# Patient Record
Sex: FEMALE | Race: OTHER | NOT HISPANIC OR LATINO | URBAN - METROPOLITAN AREA
[De-identification: names, ages, dates, MRNs, and addresses within clinical notes are randomized per-mention and may not be internally consistent; named-entity substitution may affect disease eponyms.]

---

## 2019-08-01 PROBLEM — 275937001 FAMILY HISTORY OF CANCER: Status: ACTIVE | Noted: 2019-08-01

## 2019-08-26 PROBLEM — 55822004 HYPERLIPIDEMIA: Status: ACTIVE | Noted: 2019-08-01

## 2019-08-26 PROBLEM — 197321007 FATTY LIVER: Status: ACTIVE | Noted: 2019-08-01

## 2019-08-26 PROBLEM — 166603001 ABNORMAL RESULTS OF LIVER FUNCTION STUDIES: Status: ACTIVE | Noted: 2019-08-01

## 2020-07-08 ENCOUNTER — TELEPHONE ENCOUNTER (OUTPATIENT)
Dept: URBAN - METROPOLITAN AREA CLINIC 92 | Facility: CLINIC | Age: 50
End: 2020-07-08

## 2020-07-08 NOTE — HPI-OTHER HISTORIES
Spoke with pt and she  went back on nortryptiline and she did labs with rheum and she says that it came back and went up ast 85 and alt 105 and she stopped social alcohol. No etoh x 3 weeks and  did labs ast 43 and alt 63.  alcohol does add to fat in the liver and adds to inflammation.   I would love to hear not alcohol and no nortryptiline labs.  She says failed all migraine meds.  Only med she says works is this is nortryptiline.  She on july 14 to see [place] neurologist re whit.  Maybe could try to do that and ask for something to help.  Encourage her to stay off the alcohol and follow and see how the labs and if the meds would expect it to be creeping up.  She can look into that.  Would love do a telemed at [facility] and she will let us know when sees neurology.

## 2020-07-15 ENCOUNTER — TELEPHONE ENCOUNTER (OUTPATIENT)
Dept: URBAN - METROPOLITAN AREA CLINIC 92 | Facility: CLINIC | Age: 50
End: 2020-07-15

## 2020-07-15 NOTE — HPI-TODAY'S VISIT:
Spoke with pt: she spoke with [facility] neurologist and he said not seen it do issue.  using topamax and she will try it and see how does.  Perlivertox  Prospective studies suggest that less than 1% of subjects develop elevations in serum aminotransferase levels during long term topiramate therapy. Clinically apparent hepatotoxicity from topiramate is quite rare and usually arises in patients receiving multiple other anticonvulsants. Topiramate is metabolized by CYP 3A4 and may increase risk of valproate or other anticonvulsant hepatotoxicity. A distinctive syndrome is the development of lethargy, weakness with marked serum aminotransferase elevations and hyperammonemia arising within 2 to 3 weeks of the addition (or dose increase) of topiramate to long term valproate therapy. While valproate alone can cause a similar syndrome, it appears much more common (~1%) with the combination than with valproate alone (~0.1%). This syndrome has several features suggestive of Reye syndrome (hyperammoniemia, hypoglycemia, rapid reversal of injury) and in many instances is preceded by a acute viral illness. Topiramate by itself has only rarely been linked to clinically apparent liver injury and the clinical features and course of injury have not been well defined. Topiramate has not been linked to cases of the anticonvulsant hypersensitivity syndrome and is considered a safe alternative in patients with that syndrome.  She will do labs again when can and then in 2 and 4 weeks post switch.  She needs to make an appt.

## 2020-07-21 ENCOUNTER — TELEPHONE ENCOUNTER (OUTPATIENT)
Dept: URBAN - METROPOLITAN AREA CLINIC 92 | Facility: CLINIC | Age: 50
End: 2020-07-21

## 2020-07-21 NOTE — HPI-OTHER HISTORIES
This is a scheduled follow-up appointment for this patient, a 49 year old Other Race female, after a previous visit on Dec 2019, for an evaluation for elevated liver enzymes and and history of liver biopsy. A copy of this document will be sent to the referring provider.  The patient reports a personal history of no other habits that could cause liver damage.  Chart review and pt visit review:     Pt came originally came in as a self referral for 2nd opinion re abnormal liver labs.  Saw Dr Kumar: [place] July 2020: Assessment/Plan    49 year old female who is referred for evaluation of headaches and to find alternative to nortriptyline. Her limited neurologic examination is normal. Her headaches are consistent with migraines.    headaches- migraines. Well controlled with nortriptyline, however this has been deemed cause for liver injury. I am uncertain as this would be unusual and her liver enzymes reportedly are near normal despite resumption of nortriptyline the past 3 months  - trial topamax as replacement; alternatives to consider include possibly memantine, botox. will avoid propranolol given depression history  - rizatriptan prn   seizures- she wishes to continue medication for now. No seizures since ~2016  - keppra 750mg bid  - she reports normal MRI brain in 2020   leg discomfort- possible nocturnal leg cramps. she will trial increasing gabapentin to 1200mg total before bedtime and if not helpful she knows to wean off  - discussed B complex, leg exercises  - may consider tizanidine prn   follow up in 3 months   Document Type:	CT Abdomen + Pelvis w/ IV Contrast Document Date:	February 21, 2020 14:35  Document Status:	Auth (Verified) Document Title:	CT Abdomen + Pelvis w/ IV Contrast Performed By:	Jesús Arrieta  Verified By:	Rex Campbell IV on February 21, 2020 16:26  Encounter info:	[id number], [facility], Single Visit OP, 2/21/2020 - 2/21/2020   * Final Report *  Reason For Exam Other; H/o new dermatomyositis, weight loss;Other  REPORT EXAM: CT Abdomen + Pelvis w/ IV Contrast  CLINICAL INDICATION: H/o new dermatomyositis, weight loss.  TECHNIQUE: Following administration of non-ionic IV contrast, postcontrast images through the abdomen and pelvis were obtained. [id number] If applicable, point-of-care testing was approved following departmental protocol.  COMPARISON: None  FINDINGS:  Lower Thorax: Please see separately dictated report of the chest from the same day for findings above the diaphragm.  Liver: Normal.  Gallbladder/Biliary Tree: Gallstones without acute cholecystitis.  Spleen: Normal.  Pancreas: Normal.  Adrenal Glands: Normal.  Kidneys/Ureters: Normal.  Gastrointestinal: Moderate stool burden.   Bladder: Normal.  Uterus/Adnexa: Leiomyomatous uterus with multiple exophytic fibroids. Left ovarian follicular cyst. Incidental nabothian cyst.  Lymph Nodes: Normal.  Vessels: Normal.  Peritoneum/Retroperitoneum: Normal.  Bones/Soft Tissues: Sclerotic focus, likely a bone island in the anterior L4 vertebral body (series 12, image 43).  IMPRESSION:  1.  Cholelithiasis without findings of acute cholecystitis. 2.  Leiomyomatous uterus.  The images were reviewed and interpreted by Rex Campbell MD.  Signature Line *** Final ***  Electronically Signed By:  Rex Campbell IV on  02/21/2020 16:26  Dictated by:  Jesús Arrieta   Oct 28 2019 u/s liver mildly inc echo an dsugg hepatic steatosis and no foal leisons and multiple echogenic stones in gb up to 1.2cm. GB wall not thickened and no pericholecystic fluid. GB wall no tthickened and bile ducts not dilated and cbd 4mm. Imaged pancreas portions unremarkable and spleen not enlarged and no ascites and right kidney 9.6cm. No hydronephrosis. Vessels patent.   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 Mod to severe balloning and no glycogenated nuclei and no sig protal inflammation. Bile duct unremarkable and no globules. ther recommend clincial correlation to aassess for alcohol or other med/suppleent relayed vs metabolic fatty liver. No features of autoimmune hepatitis.  oct 14 2019 igg [id number] and igm 186.   Sept 26 2019 wbc 7.6 hg 11.2 plar 350 and glu88 and cr 0.79 and na 137 and k 4.2 and ca 10 and alb 4.1 and tb 0.3 and alk 151 and ast 105 an dalt 101 and iron sat low 8% and alpha one 180 and Mm and hcv ab neg and b not immune <3.1 and asma neg and ama neg and andres pos. antichomatin 1.2 (drug induced lupus) ceruloplasmin 29.7 and ferritn 50 and b sag neg and b core total neg and hep a neg.   Saw rheum Dr munoz and did trial of steroids and felt ill and so being weaned off. She also did tetsign to suggest drug induced issue and so stopepd nortriptyline.  per livertox: Hepatotoxicity Liver test abnormalities have been reported to occur in up to 16% of patients being treated with tricyclic antidepressants, but elevations are uncommonly above 3 times the upper limit of normal. The aminotransferase abnormalities are usually mild, asymptomatic and transient, reversing even with continuation of medication. Rare instances of clinically apparent acute liver injury have been reported due to nortriptyline. The onset of jaundice is usually within 2 to 3 months of starting nortriptyline and the predominant enzyme pattern has been hepatocellular. Several acute instances of nortriptyline hepatotoxicity with marked elevations in serum aminotransferase levels and acute liver failure have been described. Signs and symptoms of hypersensitivity and autoimmunity are usually not present.  Mechanism of Injury The mechanism by which nortriptyline causes serum aminotransferase elevations and acute liver injury is not known. It undergoes extensive hepatic metabolism and a possible cause of liver injury is production of a toxic intermediate of metabolism.   Pt now here to review issues:  Dr Munoz was concerned re the keppra also but she was not info.   She is on nortriptyline for migraines and she did an mri of the head for them Dr Garcia for this. She stopped it cold turkey and I would favor a slower taper with a substitute started for this. She failed and is now back on it.    Sept 26 2019 wbc 7.6 hg 11.2 plat 350 and glu 88 and cr 0.79 and na 137 and ak 4.2 and cl 103 and alb 4.1 and tb 0.3 and alk 151 ast 105 and alt 101. iron sat 8% and she is known to this. alpha one 180 and MM,  hcv ab neg, hep b not immune <3.1 and asma neg 16 and ama  less than 20 and andres positive.  antichromatin 1.1 (0-0.9) related to drug induced lupus. ceruloplasmin 29.7 and ferrtin 50 and b sag neg and b core total neg and hep a neg.  Aug 12 2019 ahi: gallstones present in the gb. Adenomyomatosis seen gb fundus.  No gb wall thickening and no fluid aroudn the gb and murphys sign. CBd not dilated and liver and right kdiney and observed pancraes unremarkable.  No doppler.  She came in today with her  and we reviewed this.  I really think we need to check the labs now in [place] to the bx info and we don't have that as not typical for fatty liver.  She prior brought in records from [facility] [place].  RECAP:  Sept 7 2016 visit to Dr Stuart Francisco: mentions inflammatory polyarthropathy and possible SLE and AIH and other dermatitis and adjustment disorder with depressed mood.  Lists her on Pred 10mg 1.5 po qd, plaquenil 200mg po qd, zithromax 500mg po qd, prozac 40mg 2po qd, nortriptyline 25mg po qd at the time. She was reported to be doing 150 min aerobic exercise per week.  Counseled re a low fat and low card diet. Weigth was 140 pounds and height 5'1" and bmi 26.43.  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 She was having a facial rash at the time.  She says the seizure diagnosis was 3 yrs ago but has had 3 seizures and on the keppra and she says thatshe was kept on it as scan was abnormal and the doctor who is seeing at [facility]. Dr Ferrer. She says he sees her about once a year or so. Told she had white spots and saw white spots on the scans and saw [place] and [place] and no changes and last scan 3 yrs ago.  She has been on keppra along the way for this.  SHe says the labs were abnormal pre the possible lupus and mctd and inflammatory polyarthropathy. She says a doctor in [place] that she had this diagnosed.   The doctor in [place] was a friend of theirs but they live here and  worked in [place] at the time.  Her mom passed and she had post partum depression and had facial acne/rash issues and says that  saw local doctor and they gave abx and nothing helping.  She saw a lot of doctors in [place] and in [place] and nobody could help.  She says the doctors noted the lfts but 3-4 yrs now and that was before that. She says told up and to do diet.  The bx was done in [place] visit and that doctor did the liver biopsy because he suspected that lupus or aih could be th issue.  Sept 12 2016  bx minimal features for steatohepatitis mild and few lipoid granulomas/nonspecific and definitive evidence of viral hepatitis or obstructive features not identified.  She was also found to have hyperlipidemia Dr Wagner her primary saw and did not start tx.  She was seeing another gi specialist Dr Jv Davies [facility] saw there and he told her that it was fatty liver and to do diet and gave to do labs in 3m and then to see her.  She mentioned that Dr Davies was not able to get get the slides sent down for a review but he did get the info.  The doctor in [facility] still thinks that she has lupus but not clear but not on the meds for and was on them for a year and taken off after a year. She does not recall if the meds helped the lfts.  Regarding alcohol, she does not drink much and would have an occ drink or glass of wine and she stopped this all for 2m. Prior light.   She lost 10 pounds on the diet.  I think to me the  focus on trying to get the liver biopsy slides sent for 2nd opinion here with Dr Magallon and see what she says and so we can assess this.  We ened to get the IGG and IGM and follow the labs again and get the doppler u.s done and we can reassess where we are at.  40% of fatty liver can see an andres pos and follow this.  We could ask Dr Oliva Munoz to see re could this be lupus or a lupus variant.  Main medicine that she is on that could cause this  is the noortryptiline and using for migraines.  See her neurologist and ask for something in place of the nortryptiline and see how she does off of this. She has been on it for 28 yrs.  I would first try to get the medicine off and if labs not better, then do the rheum effect.   Livertox web re this med: from today as below: "Hepatotoxicity Liver test abnormalities have been reported to occur in up to 16% of patients being treated with tricyclic antidepressants, but elevations are uncommonly above 3 times the upper limit of normal.  The aminotransferase abnormalities are usually mild, asymptomatic and transient, reversing even with continuation of medication.  Rare instances of clinically apparent acute liver injury have been reported due to nortriptyline.  The onset of jaundice is usually within 2 to 3 months of starting nortriptyline and the predominant enzyme pattern has been hepatocellular.  Several acute instances of nortriptyline hepatotoxicity with marked elevations in serum aminotransferase levels and acute liver failure have been described.  Signs and symptoms of hypersensitivity and autoimmunity are usually not present.   Mechanism of Injury The mechanism by which nortriptyline causes serum aminotransferase elevations and acute liver injury is not known.  It undergoes extensive hepatic metabolism and a possible cause of liver injury is production of a toxic intermediate of metabolism.   Outcome and Management The serum aminotransferase elevations that occur on nortriptyline therapy are usually self-limited and do not require dose modification or discontinuation of therapy.  The acute hepatitis caused by nortriptyline can be severe and lead to acute liver failure.  No cases of chronic liver injury or vanishing bile duct syndrome have been reported with nortriptyline therapy.  While cross reactivity of hepatic injury with other tricyclic antidepressants has rarely been described, amitriptyline is metabolized to nortriptyline which is its active form.  Thus, switching to amitriptyline after nortriptyline toxicity should be avoided.  Switching to other forms of antidepressants such as the selective serotonin reuptake inhibitors is likely to be safe."  Duration of the visit was 25 minutes with greater than 50% of the time spent reviewing the chart and events with the pt and her  and in discussing her hx and the test results.

## 2020-07-23 ENCOUNTER — OFFICE VISIT (OUTPATIENT)
Dept: URBAN - METROPOLITAN AREA TELEHEALTH 2 | Facility: TELEHEALTH | Age: 50
End: 2020-07-23
Payer: COMMERCIAL

## 2020-07-23 DIAGNOSIS — R74.8 ABNORMAL LIVER ENZYMES: ICD-10-CM

## 2020-07-23 DIAGNOSIS — E78.5 HYPERLIPIDEMIA, UNSPECIFIED HYPERLIPIDEMIA TYPE: ICD-10-CM

## 2020-07-23 DIAGNOSIS — F41.9 ANXIETY DISORDER, UNSPECIFIED: ICD-10-CM

## 2020-07-23 DIAGNOSIS — Z98.890 HISTORY OF LIVER BIOPSY: ICD-10-CM

## 2020-07-23 DIAGNOSIS — Z86.010 HISTORY OF COLON POLYPS: ICD-10-CM

## 2020-07-23 DIAGNOSIS — K71.8 TOXIC LIVER DISEASE WITH OTHER DISORDERS OF LIVER: ICD-10-CM

## 2020-07-23 DIAGNOSIS — Z71.89 VACCINE COUNSELING: ICD-10-CM

## 2020-07-23 DIAGNOSIS — G43.709 CHRONIC MIGRAINE WITHOUT AURA WITHOUT STATUS MIGRAINOSUS, NOT INTRACTABLE: ICD-10-CM

## 2020-07-23 DIAGNOSIS — D13.5 ADENOMYOMATOSIS OF GALLBLADDER: ICD-10-CM

## 2020-07-23 DIAGNOSIS — K71.9 DRUG-INDUCED LIVER INJURY: ICD-10-CM

## 2020-07-23 DIAGNOSIS — Z79.899 HIGH RISK MEDICATION USE: ICD-10-CM

## 2020-07-23 DIAGNOSIS — K80.20 GALLSTONES: ICD-10-CM

## 2020-07-23 DIAGNOSIS — E66.3 OVERWEIGHT: ICD-10-CM

## 2020-07-23 PROCEDURE — G9903 PT SCRN TBCO ID AS NON USER: HCPCS

## 2020-07-23 PROCEDURE — 99214 OFFICE O/P EST MOD 30 MIN: CPT

## 2020-07-23 PROCEDURE — 1036F TOBACCO NON-USER: CPT

## 2020-07-23 PROCEDURE — G8417 CALC BMI ABV UP PARAM F/U: HCPCS

## 2020-07-23 PROCEDURE — G8427 DOCREV CUR MEDS BY ELIG CLIN: HCPCS

## 2020-07-23 RX ORDER — PREDNISONE 5 MG/1
1 TABLET TABLET ORAL ONCE A DAY
Refills: 0 | Status: ACTIVE | COMMUNITY
Start: 1900-01-01

## 2020-07-23 RX ORDER — AZATHIOPRINE 50 MG/1
3 TABLET ORAL
Status: ACTIVE | COMMUNITY

## 2020-07-23 RX ORDER — HYDROXYCHLOROQUINE SULFATE 200 MG/1
1.5 PO QD TABLET ORAL
Refills: 0 | Status: ACTIVE | COMMUNITY
Start: 1900-01-01

## 2020-07-23 RX ORDER — FLUOXETINE HYDROCHLORIDE 40 MG/1
TAKE 2 CAPSULES (80 MG) BY ORAL ROUTE ONCE DAILY IN THE MORNING CAPSULE ORAL 1
Qty: 0 | Refills: 0 | Status: ACTIVE | COMMUNITY
Start: 1900-01-01

## 2020-07-23 NOTE — HPI-OTHER HISTORIES
This is a scheduled follow-up appointment for this patient, a 49 year old [place] female, after a previous visit on Dec 2019, for an evaluation for elevated liver enzymes and and history of liver biopsy. A copy of this document will be sent to the referring provider.   The patient reports a personal history of no other habits that could cause liver damage.   Chart review and pt visit review:    Pt came originally came in as a self referral for 2nd opinion re abnormal liver labs.  Saw Dr Kumar: [facility] 2020: Assessment/Plan    49 year old female who is referred for evaluation of headaches and to find alternative to nortriptyline. Her limited neurologic examination is normal. Her headaches are consistent with migraines.   headaches- migraines. Well controlled with nortriptyline, however this has been deemed cause for liver injury. I am uncertain as this would be unusual and her liver enzymes reportedly are near normal despite resumption of nortriptyline the past 3 months  - trial topamax as replacement; alternatives to consider include possibly memantine, botox. will avoid propranolol given depression history  - rizatriptan prn   seizures- she wishes to continue medication for now. No seizures since ~  - keppra 750mg bid  - she reports normal MRI brain in    leg discomfort- possible nocturnal leg cramps. she will trial increasing gabapentin to 1200mg total before bedtime and if not helpful she knows to wean off  - discussed B complex, leg exercises  - may consider tizanidine prn   follow up in 3 months  Gaby 3 2020 na 139 and k 4.3 and cl 104 and co2 26 and glu 140 and cr 0.81 an daklb 3.9 and tb 0.3 and ast 84 and alt 122 and alk 75. cpk 40 and wbc 6.8 hg 10.3 and plat 365. esr 26.   Edward 3 2020 ast 67 and alt 101 and alk 103 and tb 0.3 and wbc 14.5 and hg 11 and plat 406.   2020 factin 6 and ama 2.7 and lkm  less than 1:20.  anad <1:80 and dsdna 0.8 and sydney neg and scl 100 pos.  b sag neg and b sab neg and b core total neg and hcv ab neg, HIV neg,   She did labs avoiding all social alcohol ast 55 and alt 48.  The plan that appears best for her now is to stay off the social alcohol and follow labs and see if she gets normal.  If not then revisit the medicine role.  She is ready to do a back up med if not there.   Document Type:	CT Abdomen + Pelvis w/ IV Contrast Document Date:	2020 14:35  Document Status:	Auth (Verified) Document Title:	CT Abdomen + Pelvis w/ IV Contrast Performed By:	Jesús Arrieta  Verified By:	Rex Campbell IV on 2020 16:26  Encounter info:	[id number], [facility], Single Visit OP, 2020 - 2020   * Final Report *  Reason For Exam Other; H/o new dermatomyositis, weight loss;Other  REPORT EXAM: CT Abdomen + Pelvis w/ IV Contrast  CLINICAL INDICATION: H/o new dermatomyositis, weight loss.  TECHNIQUE: Following administration of non-ionic IV contrast, postcontrast images through the abdomen and pelvis were obtained. [id number].[id number] If applicable, point-of-care testing was approved following departmental protocol.  COMPARISON: None  FINDINGS:  Lower Thorax: Please see separately dictated report of the chest from the same day for findings above the diaphragm.  Liver: Normal.  Gallbladder/Biliary Tree: Gallstones without acute cholecystitis.  Spleen: Normal.  Pancreas: Normal.  Adrenal Glands: Normal.  Kidneys/Ureters: Normal.  Gastrointestinal: Moderate stool burden.   Bladder: Normal.  Uterus/Adnexa: Leiomyomatous uterus with multiple exophytic fibroids. Left ovarian follicular cyst. Incidental nabothian cyst.  Lymph Nodes: Normal.  Vessels: Normal.  Peritoneum/Retroperitoneum: Normal.  Bones/Soft Tissues: Sclerotic focus, likely a bone island in the anterior L4 vertebral body (series 12, image 43).  IMPRESSION:  1.  Cholelithiasis without findings of acute cholecystitis. 2.  Leiomyomatous uterus.  The images were reviewed and interpreted by Rex Campbell MD.  Signature Line *** Final ***  Electronically Signed By:  Rex Campbell IV on  2020 16:26  Dictated by:  Jesús Arrieta   Oct 28 2019 u/s liver mildly inc echo and sugg hepatic steatosis and no focal leisons and multiple echogenic stones in gb up to 1.2cm. GB wall not thickened and no pericholecystic fluid. GB wall no tthickened and bile ducts not dilated and cbd 4mm. Imaged pancreas portions unremarkable and spleen not enlarged and no ascites and right kidney 9.6cm. No hydronephrosis. Vessels patent.   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 Mod to severe balloning and no glycogenated nuclei and no sig protal inflammation. Bile duct unremarkable and no globules. ther recommend clincial correlation to aassess for alcohol or other med/suppleent relayed vs metabolic fatty liver. No features of autoimmune hepatitis.  oct 14 2019 igg 8 and igm 186.   2019 wbc 7.6 hg 11.2 plar 350 and glu88 and cr 0.79 and na 137 and k 4.2 and ca 10 and alb 4.1 and tb 0.3 and alk 151 and ast 105 and alt 101 and iron sat low 8% and alpha one 180 and Mm and hcv ab neg and b not immune <3.1 and asma neg and ama neg and andres pos. antichromatin 1.2 (drug induced lupus) ceruloplasmin 29.7 and ferritn 50 and b sag neg and b core total neg and hep a neg.   Saw rheum Dr Siddiqi prior and did trial of steroids and felt ill and so being weaned off. She is Rheumatology at [facility]. She also did tetsign to suggest drug induced issue and so stopepd nortriptyline.  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  The new rash on her arms does not appear classic for DM and appears more as bruising, but per pt it is extremely pruritic and only hyperpigmented after the initial erythematous phase resolves.  It is thus most likely from DM, however, a drug-rash from imuran is also a possibility.  If the rash worsenes or she develops other symptoms/oral rash before next week, she will stop the imuran and alert me.  She will present to a [facility] to have a CBC with diff, CMP, ESR, CRP, and CPK done.  She will then increase the prendisone to 20mg daily and use clobetasol cream to see if this helps, and I will call her next week to see- if pred doesn't help, the imuran may need to be switched.  -Unclear etiology of her liver fibrosis.  I called her gastroenterologist but was unable to touch base.  -Continue imuran 100mg daily for now  -Continue plaquenil 300mg daily- we previously discussed stopping since she is unsure if it is helping, but she will continue for now and revisit at a future visit  -Prednisone  -CT chest/A/P in 2020 without any underlying malignancy  -I discussed that she needs to be up-to-date on age-appropriate cancer screening such as pap smears and mammograms.  She underwent a colonoscopy within the last 10 years.  -She was recently taken off nortriptyline by gastroenterology since it was thought that it could be a drug-induced hepatitis from the nortriptyline.  She would like to establish care with neurology for other treatments for her migraines. [1]   Nortrytline per livertox: Hepatotoxicity Liver test abnormalities have been reported to occur in up to 16% of patients being treated with tricyclic antidepressants, but elevations are uncommonly above 3 times the upper limit of normal. The aminotransferase abnormalities are usually mild, asymptomatic and transient, reversing even with continuation of medication. Rare instances of clinically apparent acute liver injury have been reported due to nortriptyline. The onset of jaundice is usually within 2 to 3 months of starting nortriptyline and the predominant enzyme pattern has been hepatocellular. Several acute instances of nortriptyline hepatotoxicity with marked elevations in serum aminotransferase levels and acute liver failure have been described. Signs and symptoms of hypersensitivity and autoimmunity are usually not present.  Mechanism of Injury The mechanism by which nortriptyline causes serum aminotransferase elevations and acute liver injury is not known. It undergoes extensive hepatic metabolism and a possible cause of liver injury is production of a toxic intermediate of metabolism.     2019 wbc 7.6 hg 11.2 plat 350 and glu 88 and cr 0.79 and na 137 and ak 4.2 and cl 103 and alb 4.1 and tb 0.3 and alk 151 ast 105 and alt 101. iron sat 8% and she is known to this. alpha one 180 and MM,  hcv ab neg, hep b not immune <3.1 and asma neg 16 and ama  less than 20 and andres positive.  antichromatin 1.1 (0-0.9) related to drug induced lupus. ceruloplasmin 29.7 and ferrtin 50 and b sag neg and b core total neg and hep a neg.  Aug 12 2019 ahi: gallstones present in the gb. Adenomyomatosis seen gb fundus.  No gb wall thickening and no fluid aroudn the gb and murphys sign. CBd not dilated and liver and right kdiney and observed pancraes unremarkable.  No doppler.  She did telemed today with her  and we reviewed this.  RECAP: She prior brought in records from [place], [place].  2016 visit to Dr Stuart Francisco: mentions inflammatory polyarthropathy and possible SLE and AIH and other dermatitis and adjustment disorder with depressed mood.  Lists her on Pred 10mg 1.5 po qd, plaquenil 200mg po qd, zithromax 500mg po qd, prozac 40mg 2po qd, nortriptyline 25mg po qd at the time. She was reported to be doing 150 min aerobic exercise per week.  Counseled re a low fat and low card diet. Weigth was 140 pounds and height 5'1" and bmi 26.43.  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 She was having a facial rash at the time.  She says the seizure diagnosis was 3 yrs ago but has had 3 seizures and on the keppra and she says thatshe was kept on it as scan was abnormal and the doctor who is seeing at [facility]. Dr Ferrer. She says he sees her about once a year or so. Told she had white spots and saw white spots on the scans and saw [place] and [place] and no changes and last scan 3 yrs ago.  She has been on keppra along the way for this.  SHe says the labs were abnormal pre the possible lupus and mctd and inflammatory polyarthropathy. She says a doctor in [place] that she had this diagnosed.   The doctor in [place] was a friend of theirs but they live here and  worked in [place] at the time.  Her mom passed and she had post partum depression and had facial acne/rash issues and says that  saw local doctor and they gave abx and nothing helping.  She saw a lot of doctors in [place] and in [place] and nobody could help.  She says the doctors noted the lfts but 3-4 yrs now and that was before that. She says told up and to do diet.  The bx was done in [place] visit and that doctor did the liver biopsy because he suspected that lupus or aih could be th issue.  2016  bx minimal features for steatohepatitis mild and few lipoid granulomas/nonspecific and definitive evidence of viral hepatitis or obstructive features not identified.  She was also found to have hyperlipidemia Dr Wagner her primary saw and did not start tx.  She was seeing another gi specialist Dr Jv Davies [facility] saw there and he told her that it was fatty liver and to do diet and gave to do labs in 3m and then to see her.  She mentioned that Dr Daives was not able to get get the slides sent down for a review but he did get the info.  The doctor in [facility] still thinks that she has lupus but not clear but not on the meds for and was on them for a year and taken off after a year. She does not recall if the meds helped the lfts.  Regarding alcohol, she does not drink much and would have an occ drink or glass of wine and she stopped this all for 2m. Prior light.    Summary: 1. Follow labs on the rheum meds for the dermatomyostis. 2. Stay on the nortryptiline and follow labs. 3. Stay off the social alcohol. 4, See how the labs do. 5. if not better then option to do bx or to do the med removal challange.  Stressed to pt the need for social distancing and strict handwashing and wearing a mask and to follow any other new or added CDC recommendations as this is an evolving target.   Duration of the visit was 23:35 by video and 12:30 by phone for 36 minutes total with greater than 50% of the time spent reviewing the chart and events with the pt and her  and in discussing her updated hx and the new test results.   More than half of the face-to-face time used for counseling and coordination of care.   Patient seen today via telehealth by agreement and consent of patient in light of current COVID-19 pandemic. I used video conferencing during the visit. The patient encounter is appropriate and reasonable under the circumstances given the patient's particular presentation at this time. The patient has been advised of the followin) the potential risks and limitations of this mode of treatment (including but not limited to the absence of in-person examination); 2) the right to refuse telehealth services at any point without affecting the right to future care; 3) the right to receive in-person services, included immediately after this consultation if an urgent need arises; 4) information, including identifiable images or information from this telehealth consult, will only be shared in accordance with HIPAA regulations. Any and all of the patient's and/or patient's family member's questions on this issue have been answered. The patient has verbally consented to be treated via telehealth services. The patient has also been advised to contact this office for worsening conditions or problems, and seek emergency medical treatment and/or call 911 if the patient deems either necessary.

## 2020-08-01 ENCOUNTER — LAB OUTSIDE AN ENCOUNTER (OUTPATIENT)
Dept: URBAN - METROPOLITAN AREA TELEHEALTH 2 | Facility: TELEHEALTH | Age: 50
End: 2020-08-01

## 2020-08-29 ENCOUNTER — LAB OUTSIDE AN ENCOUNTER (OUTPATIENT)
Dept: URBAN - METROPOLITAN AREA TELEHEALTH 2 | Facility: TELEHEALTH | Age: 50
End: 2020-08-29

## 2020-09-23 ENCOUNTER — LAB OUTSIDE AN ENCOUNTER (OUTPATIENT)
Dept: URBAN - METROPOLITAN AREA TELEHEALTH 2 | Facility: TELEHEALTH | Age: 50
End: 2020-09-23

## 2020-09-23 ENCOUNTER — OFFICE VISIT (OUTPATIENT)
Dept: URBAN - METROPOLITAN AREA TELEHEALTH 2 | Facility: TELEHEALTH | Age: 50
End: 2020-09-23
Payer: COMMERCIAL

## 2020-09-23 DIAGNOSIS — K80.20 GALLSTONES: ICD-10-CM

## 2020-09-23 DIAGNOSIS — G43.709 CHRONIC MIGRAINE WITHOUT AURA WITHOUT STATUS MIGRAINOSUS, NOT INTRACTABLE: ICD-10-CM

## 2020-09-23 DIAGNOSIS — D13.5 ADENOMYOMATOSIS OF GALLBLADDER: ICD-10-CM

## 2020-09-23 DIAGNOSIS — F41.9 ANXIETY DISORDER, UNSPECIFIED: ICD-10-CM

## 2020-09-23 DIAGNOSIS — F32.9 MAJOR DEPRESSIVE DISORDER, SINGLE EPISODE, UNSPECIFIED: ICD-10-CM

## 2020-09-23 DIAGNOSIS — K71.9 DRUG-INDUCED LIVER INJURY: ICD-10-CM

## 2020-09-23 DIAGNOSIS — R74.8 ABNORMAL LIVER ENZYMES: ICD-10-CM

## 2020-09-23 DIAGNOSIS — E78.5 HYPERLIPIDEMIA, UNSPECIFIED HYPERLIPIDEMIA TYPE: ICD-10-CM

## 2020-09-23 DIAGNOSIS — E66.3 OVERWEIGHT: ICD-10-CM

## 2020-09-23 DIAGNOSIS — M33.90 DERMATOMYOSITIS: ICD-10-CM

## 2020-09-23 PROCEDURE — 99214 OFFICE O/P EST MOD 30 MIN: CPT

## 2020-09-23 PROCEDURE — 1036F TOBACCO NON-USER: CPT

## 2020-09-23 PROCEDURE — G8417 CALC BMI ABV UP PARAM F/U: HCPCS

## 2020-09-23 PROCEDURE — G9903 PT SCRN TBCO ID AS NON USER: HCPCS

## 2020-09-23 PROCEDURE — G8427 DOCREV CUR MEDS BY ELIG CLIN: HCPCS

## 2020-09-23 RX ORDER — FLUOXETINE HYDROCHLORIDE 40 MG/1
TAKE 2 CAPSULES (80 MG) BY ORAL ROUTE ONCE DAILY IN THE MORNING CAPSULE ORAL 1
Qty: 0 | Refills: 0 | Status: ACTIVE | COMMUNITY
Start: 1900-01-01

## 2020-09-23 RX ORDER — AZATHIOPRINE 50 MG/1
3 TABLET ORAL
Status: ACTIVE | COMMUNITY

## 2020-09-23 RX ORDER — TOPIRAMATE 50 MG
1 TABLET TABLET ORAL ONCE A DAY
Status: ACTIVE | COMMUNITY

## 2020-09-23 RX ORDER — PREDNISONE 5 MG/1
1 TABLET TABLET ORAL ONCE A DAY
Refills: 0 | Status: ACTIVE | COMMUNITY
Start: 1900-01-01

## 2020-09-23 RX ORDER — HYDROXYCHLOROQUINE SULFATE 200 MG/1
1.5 PO QD TABLET ORAL
Refills: 0 | Status: ACTIVE | COMMUNITY
Start: 1900-01-01

## 2020-09-23 NOTE — HPI-OTHER HISTORIES
This is a scheduled follow-up appointment for this patient, a 49 year old [place] female, after a previous visit on Dec 2019, for an evaluation for elevated liver enzymes and and history of liver biopsy. A copy of this document will be sent to the referring provider.   The patient reports a personal history of no other habits that could cause liver damage.   Chart review and pt visit review:    Pt came originally came in as a self referral for 2nd opinion re abnormal liver labs.  Local labs:   20  glu 111 and bun 15 and cr 0.85 and na 141 and k 4.2 and ca 9.2 and alb 3.9  tb 0.5 and alk 103 and ast 74 and alt 66. Stopped the nortriptyline when saw labs.  July labs said better. alt 48 and ast 63. tsh 1.34 and estr 39 and chol 196 and trg 57 and hdl 54 and ldl 131 and wbc 4.7 hg 10.2 and plat 392 and glu 72 and cr 0.83  ma 142 and k 4.1 and alb 3.9 and tv 0.7 and alk 69 and ast 63 and alt 48.   Gaby 3 2020  [place] na 139 k 4.3 and cl 104 and co2 26 and glu 140 and bun 15 and cr 0.81 and alb 3.9 and tb 0.3 and ca 9.9 and ast 84 and alt 122 and alk 75.  cpk 40 and keppra 33 and wbc 6.8 and hg 10,3 and plat 365.  esr 26. On the nortriptyline and same dose.  Saw Dr Kumar: [place] 2020: Assessment/Plan    49 year old female who is referred for evaluation of headaches and to find alternative to nortriptyline. Her limited neurologic examination is normal. Her headaches are consistent with migraines.   headaches- migraines. Well controlled with nortriptyline, however this has been deemed cause for liver injury. I am uncertain as this would be unusual and her liver enzymes reportedly are near normal despite resumption of nortriptyline the past 3 months  - trial topamax as replacement; alternatives to consider include possibly memantine, botox. will avoid propranolol given depression history  - rizatriptan prn   seizures- she wishes to continue medication for now. No seizures since ~  - keppra 750mg bid  - she reports normal MRI brain in    leg discomfort- possible nocturnal leg cramps. she will trial increasing gabapentin to 1200mg total before bedtime and if not helpful she knows to wean off  - discussed B complex, leg exercises  - may consider tizanidine prn   follow up in 3 months  Gaby 3 2020 na 139 and k 4.3 and cl 104 and co2 26 and glu 140 and cr 0.81 an daklb 3.9 and tb 0.3 and ast 84 and alt 122 and alk 75. cpk 40 and wbc 6.8 hg 10.3 and plat 365. esr 26.   Edward 3 2020 ast 67 and alt 101 and alk 103 and tb 0.3 and wbc 14.5 and hg 11 and plat 406.   2020 factin 6 and ama 2.7 and lkm  less than 1:20.  anad <1:80 and dsdna 0.8 and sydney neg and scl 100 pos.  b sag neg and b sab neg and b core total neg and hcv ab neg, HIV neg,   She did labs avoiding all social alcohol ast 55 and alt 48. She is still avoiding the alcohol.   Document Type:	CT Abdomen + Pelvis w/ IV Contrast Document Date:	2020 14:35  Document Status:	Auth (Verified) Document Title:	CT Abdomen + Pelvis w/ IV Contrast Performed By:	Jesús Arrieta  Verified By:	Rex Campbell IV on 2020 16:26  Encounter info:	[id number], [facility], Single Visit OP, 2020 - 2020   * Final Report *  Reason For Exam Other; H/o new dermatomyositis, weight loss;Other  REPORT EXAM: CT Abdomen + Pelvis w/ IV Contrast  CLINICAL INDICATION: H/o new dermatomyositis, weight loss.  TECHNIQUE: Following administration of non-ionic IV contrast, postcontrast images through the abdomen and pelvis were obtained. [id number].[id number] If applicable, point-of-care testing was approved following departmental protocol.  COMPARISON: None  FINDINGS:  Lower Thorax: Please see separately dictated report of the chest from the same day for findings above the diaphragm.  Liver: Normal.  Gallbladder/Biliary Tree: Gallstones without acute cholecystitis.  Spleen: Normal.  Pancreas: Normal.  Adrenal Glands: Normal.  Kidneys/Ureters: Normal.  Gastrointestinal: Moderate stool burden.   Bladder: Normal.  Uterus/Adnexa: Leiomyomatous uterus with multiple exophytic fibroids. Left ovarian follicular cyst. Incidental nabothian cyst.  Lymph Nodes: Normal.  Vessels: Normal.  Peritoneum/Retroperitoneum: Normal.  Bones/Soft Tissues: Sclerotic focus, likely a bone island in the anterior L4 vertebral body (series 12, image 43).  IMPRESSION:  1.  Cholelithiasis without findings of acute cholecystitis. 2.  Leiomyomatous uterus.  The images were reviewed and interpreted by Rex Campbell MD.  Signature Line *** Final ***  Electronically Signed By:  Rex Campbell IV on  2020 16:26  Dictated by:  Jesús Arrieta   Oct 28 2019 u/s liver mildly inc echo and sugg hepatic steatosis and no focal leisons and multiple echogenic stones in gb up to 1.2cm. GB wall not thickened and no pericholecystic fluid. GB wall no tthickened and bile ducts not dilated and cbd 4mm. Imaged pancreas portions unremarkable and spleen not enlarged and no ascites and right kidney 9.6cm. No hydronephrosis. Vessels patent.   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 Mod to severe balloning and no glycogenated nuclei and no sig protal inflammation. Bile duct unremarkable and no globules. ther recommend clincial correlation to aassess for alcohol or other med/suppleent relayed vs metabolic fatty liver. No features of autoimmune hepatitis.  oct 14 2019 igg 2078 and igm 186.   2019 wbc 7.6 hg 11.2 plar 350 and glu88 and cr 0.79 and na 137 and k 4.2 and ca 10 and alb 4.1 and tb 0.3 and alk 151 and ast 105 and alt 101 and iron sat low 8% and alpha one 180 and Mm and hcv ab neg and b not immune <3.1 and asma neg and ama neg and andres pos. antichromatin 1.2 (drug induced lupus) ceruloplasmin 29.7 and ferritn 50 and b sag neg and b core total neg and hep a neg.   Saw rheum Dr Siddiqi prior and did trial of steroids and felt ill and so being weaned off. 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  The new rash on her arms does not appear classic for DM and appears more as bruising, but per pt it is extremely pruritic and only hyperpigmented after the initial erythematous phase resolves.  It is thus most likely from DM, however, a drug-rash from imuran is also a possibility.  If the rash worsenes or she develops other symptoms/oral rash before next week, she will stop the imuran and alert me.  She will present to a [facility] to have a CBC with diff, CMP, ESR, CRP, and CPK done.  She will then increase the prendisone to 20mg daily and use clobetasol cream to see if this helps, and I will call her next week to see- if pred doesn't help, the imuran may need to be switched.  -Unclear etiology of her liver fibrosis.  I called her gastroenterologist but was unable to touch base.  -Continue imuran 100mg daily for now  -Continue plaquenil 300mg daily- we previously discussed stopping since she is unsure if it is helping, but she will continue for now and revisit at a future visit  -Prednisone  -CT chest/A/P in 2020 without any underlying malignancy  -I discussed that she needs to be up-to-date on age-appropriate cancer screening such as pap smears and mammograms.  She underwent a colonoscopy within the last 10 years.  -She was recently taken off nortriptyline by gastroenterology since it was thought that it could be a drug-induced hepatitis from the nortriptyline.  She would like to establish care with neurology for other treatments for her migraines. [1]  needs to do an appt.   Nortrytline per livertox: Hepatotoxicity Liver test abnormalities have been reported to occur in up to 16% of patients being treated with tricyclic antidepressants, but elevations are uncommonly above 3 times the upper limit of normal. The aminotransferase abnormalities are usually mild, asymptomatic and transient, reversing even with continuation of medication. Rare instances of clinically apparent acute liver injury have been reported due to nortriptyline. The onset of jaundice is usually within 2 to 3 months of starting nortriptyline and the predominant enzyme pattern has been hepatocellular. Several acute instances of nortriptyline hepatotoxicity with marked elevations in serum aminotransferase levels and acute liver failure have been described. Signs and symptoms of hypersensitivity and autoimmunity are usually not present.  Mechanism of Injury The mechanism by which nortriptyline causes serum aminotransferase elevations and acute liver injury is not known. It undergoes extensive hepatic metabolism and a possible cause of liver injury is production of a toxic intermediate of metabolism.     2019 wbc 7.6 hg 11.2 plat 350 and glu 88 and cr 0.79 and na 137 and ak 4.2 and cl 103 and alb 4.1 and tb 0.3 and alk 151 ast 105 and alt 101. iron sat 8% and she is known to this. alpha one 180 and MM,  hcv ab neg, hep b not immune <3.1 and asma neg 16 and ama  less than 20 and andres positive.  antichromatin 1.1 (0-0.9) related to drug induced lupus. ceruloplasmin 29.7 and ferrtin 50 and b sag neg and b core total neg and hep a neg.  Aug 12 2019 ahi: gallstones present in the gb. Adenomyomatosis seen gb fundus.  No gb wall thickening and no fluid aroudn the gb and murphys sign. CBd not dilated and liver and right kdiney and observed pancraes unremarkable.  No doppler.  She did telemed today with her  and we reviewed this.  RECAP: She prior brought in records from [facility] [place].  2016 visit to Dr Stuart Francisco: mentions inflammatory polyarthropathy and possible SLE and AIH and other dermatitis and adjustment disorder with depressed mood.  Lists her on Pred 10mg 1.5 po qd, plaquenil 200mg po qd, zithromax 500mg po qd, prozac 40mg 2po qd, nortriptyline 25mg po qd at the time. She was reported to be doing 150 min aerobic exercise per week.  Counseled re a low fat and low card diet. Weigth was 140 pounds and height 5'1" and bmi 26.43.  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 She was having a facial rash at the time.  She says the seizure diagnosis was 3 yrs ago but has had 3 seizures and on the keppra and she says thatshe was kept on it as scan was abnormal and the doctor who is seeing at [facility]. Dr Ferrer. She says he sees her about once a year or so. Told she had white spots and saw white spots on the scans and saw [place] and [place] and no changes and last scan 3 yrs ago.  She has been on keppra along the way for this.  SHe says the labs were abnormal pre the possible lupus and mctd and inflammatory polyarthropathy. She says a doctor in [place] that she had this diagnosed.   The doctor in [place] was a friend of theirs but they live here and  worked in [place] at the time.  Her mom passed and she had post partum depression and had facial acne/rash issues and says that  saw local doctor and they gave abx and nothing helping.  She saw a lot of doctors in [place] and in [place] and nobody could help.  She says the doctors noted the lfts but 3-4 yrs now and that was before that. She says told up and to do diet.  The bx was done in [place] visit and that doctor did the liver biopsy because he suspected that lupus or aih could be th issue.  2016  bx minimal features for steatohepatitis mild and few lipoid granulomas/nonspecific and definitive evidence of viral hepatitis or obstructive features not identified.  She was also found to have hyperlipidemia Dr Wagner her primary saw and did not start tx.  She was seeing another gi specialist Dr Jv Davies [facility] saw there and he told her that it was fatty liver and to do diet and gave to do labs in 3m and then to see her.  She mentioned that Dr Davies was not able to get get the slides sent down for a review but he did get the info.  The doctor in [facility] still thinks that she has lupus but not clear but not on the meds for and was on them for a year and taken off after a year. She does not recall if the meds helped the lfts.  Regarding alcohol, she does not drink much and would have an occ drink or glass of wine and she stopped this all for 2m. Prior light.    Plan: 1. Do the labs now 2 weeks off the nortriptyline and see if they are better. 2. if the lfts not better can consider a bx. 3. She is now on topamax now for this and also a back up triptan for headache. 4. Stay off the social alcohol. 5. See how the labs do. 6. She is doing well on the topomax.  Stressed to pt the need for social distancing and strict handwashing and wearing a mask and to follow any other new or added CDC recommendations as this is an evolving target.   Duration of the visit was 31 min by lara with greater than 50% of the time spent reviewing the chart and events with the pt and her  and in discussing her updated hx and the new test results.   More than half of the face-to-face time used for counseling and coordination of care.   Patient seen today via telehealth by agreement and consent of patient in light of current COVID-19 pandemic. I used video conferencing during the visit. The patient encounter is appropriate and reasonable under the circumstances given the patient's particular presentation at this time. The patient has been advised of the followin) the potential risks and limitations of this mode of treatment (including but not limited to the absence of in-person examination); 2) the right to refuse telehealth services at any point without affecting the right to future care; 3) the right to receive in-person services, included immediately after this consultation if an urgent need arises; 4) information, including identifiable images or information from this telehealth consult, will only be shared in accordance with HIPAA regulations. Any and all of the patient's and/or patient's family member's questions on this issue have been answered. The patient has verbally consented to be treated via telehealth services. The patient has also been advised to contact this office for worsening conditions or problems, and seek emergency medical treatment and/or call 911 if the patient deems either necessary.

## 2020-10-07 ENCOUNTER — TELEPHONE ENCOUNTER (OUTPATIENT)
Dept: URBAN - METROPOLITAN AREA CLINIC 92 | Facility: CLINIC | Age: 50
End: 2020-10-07

## 2020-10-07 ENCOUNTER — LAB OUTSIDE AN ENCOUNTER (OUTPATIENT)
Dept: URBAN - METROPOLITAN AREA TELEHEALTH 2 | Facility: TELEHEALTH | Age: 50
End: 2020-10-07

## 2020-10-07 NOTE — HPI-OTHER HISTORIES
Alissa Garcia,  U.s: Cholelithiasis (gallstones) seen but no biliary dilatation seen with common duct 4mm.  No splenomegaly seen and spleen 11.6cm normal. Liver normal echogenicity. No liver lesions.  Pancreas obscured by overlying structures. Right Kidney measures 10.1 cm. Normal echogenicity with no hydronephrosis. Left Kidney measures 10.4 cm. Normal echogenicity with no hydronephrosis. Liver vessels patent.   So for gallstones no real therapy if not causing problems and so typcially recommend low fat diet and watch for symptoms and most people won't need a surgery but need to be aware of it.   Thanks  Dr Sosa

## 2020-10-21 ENCOUNTER — LAB OUTSIDE AN ENCOUNTER (OUTPATIENT)
Dept: URBAN - METROPOLITAN AREA TELEHEALTH 2 | Facility: TELEHEALTH | Age: 50
End: 2020-10-21

## 2020-11-23 ENCOUNTER — LAB OUTSIDE AN ENCOUNTER (OUTPATIENT)
Dept: URBAN - METROPOLITAN AREA TELEHEALTH 2 | Facility: TELEHEALTH | Age: 50
End: 2020-11-23

## 2020-11-30 ENCOUNTER — TELEPHONE ENCOUNTER (OUTPATIENT)
Dept: URBAN - METROPOLITAN AREA CLINIC 92 | Facility: CLINIC | Age: 50
End: 2020-11-30

## 2020-11-30 NOTE — HPI-OTHER HISTORIES
Dear Jose Oshea  Erin printed nov 24 labs:  na 138 and k 3.8 and cl 105 and co2 23 and glu 136 elevated. bun 11 and cr 0,90. Alb 3.7 and tb 1.1 slightly up but not fractionated, ca 9.5. ast 76 and alt 43 and alk 109 and cpk 96. vit d 76. wbc 4 hg 10.4 little low and mcv 98.8 little up and plat 284. Neutrophils 65% and lymph 29%. ESR 58 elevated still. crp 8.6. mono screen negative.  Nov 10 ast 77 and alt 52 and alk 92 and tb 1.0. hg 10.0.  Gaby 3 ast 84 and alt 122 and alk  75 and tb 0.3.   Edward 3 ast 67 and alt 101 and alk 103 and tb 0.3.   So in summary the liver enzyme trend is one of dropping and so we need to follow this and carefully track any medicine changes.  Thank you  Emil Sosa MD

## 2020-12-01 ENCOUNTER — OFFICE VISIT (OUTPATIENT)
Dept: URBAN - METROPOLITAN AREA TELEHEALTH 2 | Facility: TELEHEALTH | Age: 50
End: 2020-12-01
Payer: COMMERCIAL

## 2020-12-01 DIAGNOSIS — Z86.010 HISTORY OF COLON POLYPS: ICD-10-CM

## 2020-12-01 DIAGNOSIS — E78.5 HYPERLIPIDEMIA, UNSPECIFIED HYPERLIPIDEMIA TYPE: ICD-10-CM

## 2020-12-01 DIAGNOSIS — D13.5 ADENOMYOMATOSIS OF GALLBLADDER: ICD-10-CM

## 2020-12-01 DIAGNOSIS — Z71.89 VACCINE COUNSELING: ICD-10-CM

## 2020-12-01 DIAGNOSIS — Z79.899 HIGH RISK MEDICATION USE: ICD-10-CM

## 2020-12-01 DIAGNOSIS — K71.8 TOXIC LIVER DISEASE WITH OTHER DISORDERS OF LIVER: ICD-10-CM

## 2020-12-01 DIAGNOSIS — K71.9 DRUG-INDUCED LIVER INJURY: ICD-10-CM

## 2020-12-01 DIAGNOSIS — F32.9 MAJOR DEPRESSIVE DISORDER, SINGLE EPISODE, UNSPECIFIED: ICD-10-CM

## 2020-12-01 DIAGNOSIS — R74.8 ABNORMAL LIVER ENZYMES: ICD-10-CM

## 2020-12-01 DIAGNOSIS — Z87.898 HISTORY OF SEIZURE: ICD-10-CM

## 2020-12-01 DIAGNOSIS — M33.90 DERMATOMYOSITIS: ICD-10-CM

## 2020-12-01 DIAGNOSIS — F41.9 ANXIETY DISORDER, UNSPECIFIED: ICD-10-CM

## 2020-12-01 PROCEDURE — G9903 PT SCRN TBCO ID AS NON USER: HCPCS

## 2020-12-01 PROCEDURE — 99215 OFFICE O/P EST HI 40 MIN: CPT

## 2020-12-01 PROCEDURE — G8483 FLU IMM NO ADMIN DOC REA: HCPCS

## 2020-12-01 PROCEDURE — G8417 CALC BMI ABV UP PARAM F/U: HCPCS

## 2020-12-01 PROCEDURE — 1036F TOBACCO NON-USER: CPT

## 2020-12-01 PROCEDURE — G8427 DOCREV CUR MEDS BY ELIG CLIN: HCPCS

## 2020-12-01 RX ORDER — FLUOXETINE HYDROCHLORIDE 40 MG/1
TAKE 2 CAPSULES (80 MG) BY ORAL ROUTE ONCE DAILY IN THE MORNING CAPSULE ORAL 1
Qty: 0 | Refills: 0 | Status: ACTIVE | COMMUNITY
Start: 1900-01-01

## 2020-12-01 RX ORDER — PREDNISONE 5 MG/1
2 TABLETS TABLET ORAL ONCE A DAY
Refills: 0 | Status: ACTIVE | COMMUNITY
Start: 1900-01-01

## 2020-12-01 RX ORDER — HYDROXYCHLOROQUINE SULFATE 200 MG/1
1.5 PO QD TABLET ORAL
Refills: 0 | Status: ACTIVE | COMMUNITY
Start: 1900-01-01

## 2020-12-01 RX ORDER — NORTRIPTYLINE HYDROCHLORIDE 25 MG/1
1 CAPSULE CAPSULE ORAL ONCE A DAY
Status: ACTIVE | COMMUNITY

## 2020-12-01 RX ORDER — AZATHIOPRINE 50 MG/1
3 TABLET ORAL
Status: ACTIVE | COMMUNITY

## 2020-12-01 RX ORDER — TOPIRAMATE 50 MG
1 TABLET TABLET ORAL ONCE A DAY
Status: DISCONTINUED | COMMUNITY

## 2020-12-01 NOTE — EXAM-PHYSICAL EXAM
Telemed:  Gen: no distress. Feeling better off the topamax. Eyes: no jaundice. Mouth: no thrush. CV: no chest pain. Resp: no wheezes. Abd: no pain. Ext: no edema.	 Neuro: no weakness. Headache already better.

## 2020-12-01 NOTE — HPI-OTHER HISTORIES
This is a scheduled follow-up appointment for this patient, a 50 year old  female, after a previous visit for an evaluation for elevated liver enzymes and and history of liver biopsy.  A copy of this document will be sent to the referring provider.   The patient reports a personal history of no other habits that could cause liver damage.   Chart review and pt visit review:   Erin printed  labs:  na 138 and k 3.8 and cl 105 and co2 23 and glu 136 elevated. bun 11 and cr 0,90. Alb 3.7 and tb 1.1 slightly up but not fractionated, ca 9.5. ast 76 and alt 43 and alk 109 and cpk 96. vit d 76. wbc 4 hg 10.4 little low and mcv 98.8 little up and plat 284. Neutrophils 65% and lymph 29%. ESR 58 elevated still. crp 8.6. mono screen negative.  Nov 10 ast 77 and alt 52 and alk 92 and tb 1.0. hg 10.0.  Gaby 3 ast 84 and alt 122 and alk  75 and tb 0.3.   Edward 3 ast 67 and alt 101 and alk 103 and tb 0.3.   She is back on her headache medicine: noryptiline and failing topamax and back on the 25mg po qd.   and she feel that the topamax did not work.  Would labs in 1m and 3m.   Oct 2020: U.s: Cholelithiasis (gallstones) seen but no biliary dilatation seen with common duct 4mm.  No splenomegaly seen and spleen 11.6cm normal. Liver normal echogenicity. No liver lesions.  Pancreas obscured by overlying structures. Right Kidney measures 10.1 cm. Normal echogenicity with no hydronephrosis. Left Kidney measures 10.4 cm. Normal echogenicity with no hydronephrosis. Liver vessels patent.    Document Type:	US Abdomen Complete Document Date:	2020 07:58  Document Status:	Auth (Verified) Document Title:	US Abdomen Complete Performed By:	Consuelo Alcantara  Verified By:	Consuelo Alcantara on 2020 08:44  Encounter info:	[id number], [facility], Single Visit OP, 10/7/2020 - 10/7/2020   * Final Report *  Reason For Exam ASSESS LIVER AND SPLEEN AND VESSELS AND TO ASSESS GB AND POSSIBEL STONES VS POLYPSS  REPORT EXAM: US Abdomen Doppler Complete, US Abdomen Complete  CLINICAL INDICATION: ASSESS LIVER AND SPLEEN AND VESSELS AND TO ASSESS GB AND POSSIBEL STONES VS POLYPSS.  TECHNIQUE: Grayscale, pulsed wave and color Doppler sonography of the upper abdomen were performed. [id number]  COMPARISON: 2020  FINDINGS:  Liver: Normal echogenicity. No lesions.  Bile Ducts: No dilated intrahepatic biliary radicles. Common duct measures 4 mm.  Gallbladder: Cholelithiasis. Scruggs's sign is negative.  Pancreas: Obscured by overlying structures.  Right Kidney: Measures 10.1 cm. Normal echogenicity. No hydronephrosis.  Left Kidney: Measures 10.4 cm. Normal echogenicity. No hydronephrosis.  Spleen: Measures 11.6 cm.  Aorta: Normal caliber where imaged.  IVC: Normal proximally, not imaged distally.   Hepatic Veins: Normal.  Portal Veins: Hepatopetal flow. 27 cm/s.  Hepatic Arteries: Peak systolic velocity 92 cm/s. Resistive index 0.6.  Other: None.  IMPRESSION: 1. Cholelithiasis. No biliary dilatation. 2. No splenomegaly.  Signature Line *** Final ***  Electronically Signed By:  Consuelo Alcantara on  10/07/2020 08:44  Dictated by:  Consuelo Alcantara   Spoke for gallstones no real therapy if not causing problems and so typcially recommend low fat diet and watch for symptoms and most people won't need a surgery but she does need to be aware of it.   20  glu 111 and bun 15 and cr 0.85 and na 141 and k 4.2 and ca 9.2 and alb 3.9  tb 0.5 and alk 103 and ast 74 and alt 66. Stopped the nortriptyline when saw labs.  July labs said better. alt 48 and ast 63. tsh 1.34 and estr 39 and chol 196 and trg 57 and hdl 54 and ldl 131 and wbc 4.7 hg 10.2 and plat 392 and glu 72 and cr 0.83  ma 142 and k 4.1 and alb 3.9 and tb 0.7 and alk 69 and ast 63 and alt 48.   Gaby 3 2020  [place] na 139 k 4.3 and cl 104 and co2 26 and glu 140 and bun 15 and cr 0.81 and alb 3.9 and tb 0.3 and ca 9.9 and ast 84 and alt 122 and alk 75.  cpk 40 and keppra 33 and wbc 6.8 and hg 10,3 and plat 365.  esr 26. On the nortriptyline and same dose.    Addendum by Lou Parker on 2020 13:46:33 (Verified) She had 2 episodes of hemoptysis yesterday-1 was while brushing her teeth but denies it from her giorgi.  I discussed need to immediately assess this-she had small pulmonary nodules on her prior CT chest from 2020 noted as insignificant without a history smoking history.  However, I will now repeat given this possible hemoptysis.  She was somewhat resistant to obtaining the CT chest and wanted to wait to see if the hemoptysis recurred, and I discussed that she needs to schedule it if so as well as let me know.  She will go to emergency room if the hemoptysis worsens.  She was also noted to have mild leukopenia, and she will follow-up next week for repeat bloodwork.  I discussed with her that I believe that her tiredness and feeling unwell is from the Topamax, and she will reach out to neurology for possible other therapies for her migraines. Signature Line Electronically Signed by: Lou Parker MD on 20.01:48 PM  She did telemed with Dr Kumar and placed on topamax and she was off the nortryptiline sept to now dec 1 2020.  Oct 14 2020 visit neurology: 49 year old female who is referred for evaluation of headaches and to find alternative to nortriptyline. Her limited neurologic examination is normal. Her headaches are consistent with migraines.  headaches- migraines. Well controlled with nortriptyline in the past, however this has been deemed cause for liver injury.  - well controlled on topamax. She will continue topamax, she will try 25mg a day instead of 50mg; alternatives to consider include possibly memantine, botox. will avoid propranolol given depression history  - rizatriptan prn  seizures- she wishes to continue medication for now. No seizures since ~  - keppra 750mg bid  - she reports normal MRI brain in 2020  leg discomfort- possible nocturnal leg cramps.  - well managed with gabapentin 1600mg at bedtime  - discussed B complex, leg exercises  - may consider tizanidine prn  follow up in 11 months  Prior saw Dr Kumar: [place] 2020: Assessment/Plan    49 year old female who is referred for evaluation of headaches and to find alternative to nortriptyline. Her limited neurologic examination is normal. Her headaches are consistent with migraines.   headaches- migraines. Well controlled with nortriptyline, however this has been deemed cause for liver injury. I am uncertain as this would be unusual and her liver enzymes reportedly are near normal despite resumption of nortriptyline the past 3 months  - trial topamax as replacement; alternatives to consider include possibly memantine, botox. will avoid propranolol given depression history  - rizatriptan prn   seizures- she wishes to continue medication for now. No seizures since ~  - keppra 750mg bid  - she reports normal MRI brain in    leg discomfort- possible nocturnal leg cramps. she will trial increasing gabapentin to 1200mg total before bedtime and if not helpful she knows to wean off  - discussed B complex, leg exercises  - may consider tizanidine prn  follow up in 3 months  Gaby 3 2020 na 139 and k 4.3 and cl 104 and co2 26 and glu 140 and cr 0.81 an daklb 3.9 and tb 0.3 and ast 84 and alt 122 and alk 75. cpk 40 and wbc 6.8 hg 10.3 and plat 365. esr 26.   Edward 3 2020 ast 67 and alt 101 and alk 103 and tb 0.3 and wbc 14.5 and hg 11 and plat 406.   2020 factin 6 and ama 2.7 and lkm  less than 1:20.  anad <1:80 and dsdna 0.8 and sydney neg and scl 100 pos.  b sag neg and b sab neg and b core total neg and hcv ab neg, HIV neg,   She did labs avoiding all social alcohol ast 55 and alt 48. She is still avoiding the alcohol.   Document Type:	CT Abdomen + Pelvis w/ IV Contrast Document Date:	2020 14:35  Document Status:	Auth (Verified) Document Title:	CT Abdomen + Pelvis w/ IV Contrast Performed By:	Jesús Arrieta  Verified By:	Rex Campbell IV on 2020 16:26  Encounter info:	[id number], [facility], Single Visit OP, 2020 - 2020   * Final Report *  Reason For Exam Other; H/o new dermatomyositis, weight loss;Other  REPORT EXAM: CT Abdomen + Pelvis w/ IV Contrast  CLINICAL INDICATION: H/o new dermatomyositis, weight loss.  TECHNIQUE: Following administration of non-ionic IV contrast, postcontrast images through the abdomen and pelvis were obtained. ESRC.1.7.1 If applicable, point-of-care testing was approved following departmental protocol.  COMPARISON: None  FINDINGS:  Lower Thorax: Please see separately dictated report of the chest from the same day for findings above the diaphragm.  Liver: Normal.  Gallbladder/Biliary Tree: Gallstones without acute cholecystitis.  Spleen: Normal.  Pancreas: Normal.  Adrenal Glands: Normal.  Kidneys/Ureters: Normal.  Gastrointestinal: Moderate stool burden.   Bladder: Normal.  Uterus/Adnexa: Leiomyomatous uterus with multiple exophytic fibroids. Left ovarian follicular cyst. Incidental nabothian cyst.  Lymph Nodes: Normal.  Vessels: Normal.  Peritoneum/Retroperitoneum: Normal.  Bones/Soft Tissues: Sclerotic focus, likely a bone island in the anterior L4 vertebral body (series 12, image 43).  IMPRESSION:  1.  Cholelithiasis without findings of acute cholecystitis. 2.  Leiomyomatous uterus.  The images were reviewed and interpreted by Rex Campbell MD.  Signature Line *** Final ***  Electronically Signed By:  Rex Campbell IV on  2020 16:26  Dictated by:  Jesús Arrieta   Oct 28 2019 u/s liver mildly inc echo and sugg hepatic steatosis and no focal leisons and multiple echogenic stones in gb up to 1.2cm. GB wall not thickened and no pericholecystic fluid. GB wall no tthickened and bile ducts not dilated and cbd 4mm. Imaged pancreas portions unremarkable and spleen not enlarged and no ascites and right kidney 9.6cm. No hydronephrosis. Vessels patent.   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 Mod to severe balloning and no glycogenated nuclei and no sig protal inflammation. Bile duct unremarkable and no globules. ther recommend clincial correlation to aassess for alcohol or other med/suppleent relayed vs metabolic fatty liver. No features of autoimmune hepatitis.  oct 14 2019 igg  and igm 186.   2019 wbc 7.6 hg 11.2 plar 350 and glu88 and cr 0.79 and na 137 and k 4.2 and ca 10 and alb 4.1 and tb 0.3 and alk 151 and ast 105 and alt 101 and iron sat low 8% and alpha one 180 and Mm and hcv ab neg and b not immune <3.1 and asma neg and ama neg and andres pos. antichromatin 1.2 (drug induced lupus) ceruloplasmin 29.7 and ferritn 50 and b sag neg and b core total neg and hep a neg.   Saw rheum Dr Siddiqi prior and did trial of steroids and felt ill and so being weaned off. She is Rheumatology at [place].    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  The new rash on her arms does not appear classic for DM and appears more as bruising, but per pt it is extremely pruritic and only hyperpigmented after the initial erythematous phase resolves.  It is thus most likely from DM, however, a drug-rash from imuran is also a possibility.  If the rash worsenes or she develops other symptoms/oral rash before next week, she will stop the imuran and alert me.  She will present to a [facility] to have a CBC with diff, CMP, ESR, CRP, and CPK done.  She will then increase the prendisone to 20mg daily and use clobetasol cream to see if this helps, and I will call her next week to see- if pred doesn't help, the imuran may need to be switched.  -Unclear etiology of her liver fibrosis.  I called her gastroenterologist but was unable to touch base.  -Continue imuran 100mg daily for now  -Continue plaquenil 300mg daily- we previously discussed stopping since she is unsure if it is helping, but she will continue for now and revisit at a future visit  -Prednisone  -CT chest/A/P in 2020 without any underlying malignancy  -I discussed that she needs to be up-to-date on age-appropriate cancer screening such as pap smears and mammograms.  She underwent a colonoscopy within the last 10 years.  -She was recently taken off nortriptyline by gastroenterology since it was thought that it could be a drug-induced hepatitis from the nortriptyline.  She would like to establish care with neurology for other treatments for her migraines. [1]    Nortrytline per livertox: Hepatotoxicity Liver test abnormalities have been reported to occur in up to 16% of patients being treated with tricyclic antidepressants, but elevations are uncommonly above 3 times the upper limit of normal. The aminotransferase abnormalities are usually mild, asymptomatic and transient, reversing even with continuation of medication. Rare instances of clinically apparent acute liver injury have been reported due to nortriptyline. The onset of jaundice is usually within 2 to 3 months of starting nortriptyline and the predominant enzyme pattern has been hepatocellular. Several acute instances of nortriptyline hepatotoxicity with marked elevations in serum aminotransferase levels and acute liver failure have been described. Signs and symptoms of hypersensitivity and autoimmunity are usually not present.  Mechanism of Injury The mechanism by which nortriptyline causes serum aminotransferase elevations and acute liver injury is not known. It undergoes extensive hepatic metabolism and a possible cause of liver injury is production of a toxic intermediate of metabolism.     2019 wbc 7.6 hg 11.2 plat 350 and glu 88 and cr 0.79 and na 137 and ak 4.2 and cl 103 and alb 4.1 and tb 0.3 and alk 151 ast 105 and alt 101. iron sat 8% and she is known to this. alpha one 180 and MM,  hcv ab neg, hep b not immune <3.1 and asma neg 16 and ama  less than 20 and andres positive.  antichromatin 1.1 (0-0.9) related to drug induced lupus. ceruloplasmin 29.7 and ferrtin 50 and b sag neg and b core total neg and hep a neg.  Aug 12 2019 ahi: gallstones present in the gb. Adenomyomatosis seen gb fundus.  No gb wall thickening and no fluid aroudn the gb and murphys sign. CBd not dilated and liver and right kdiney and observed pancraes unremarkable.  No doppler.  She did telemed today with her  and we reviewed this.  RECAP: She prior brought in records from [facility] [place].  2016 visit to Dr Stuart Francisco: mentions inflammatory polyarthropathy and possible SLE and AIH and other dermatitis and adjustment disorder with depressed mood.  Lists her on Pred 10mg 1.5 po qd, plaquenil 200mg po qd, zithromax 500mg po qd, prozac 40mg 2po qd, nortriptyline 25mg po qd at the time. She was reported to be doing 150 min aerobic exercise per week.  Counseled re a low fat and low card diet. Weigth was 140 pounds and height 5'1" and bmi 26.43.  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 She was having a facial rash at the time.  She says the seizure diagnosis was 3 yrs ago but has had 3 seizures and on the keppra and she says thatshe was kept on it as scan was abnormal and the doctor who is seeing at [facility]. Dr Ferrer. She says he sees her about once a year or so. Told she had white spots and saw white spots on the scans and [place] and [place] and no changes and last scan 3 yrs ago.  She has been on keppra along the way for this.  SHe says the labs were abnormal pre the possible lupus and mctd and inflammatory polyarthropathy. She says a doctor in [place] that she had this diagnosed.   The doctor in [place] was a friend of theirs but they live here and  worked in [place] at the time.  Her mom passed and she had post partum depression and had facial acne/rash issues and says that  saw local doctor and they gave abx and nothing helping.  She saw a lot of doctors in [place] and in [place] and nobody could help.  She says the doctors noted the lfts but 3-4 yrs now and that was before that. She says told up and to do diet.  The bx was done in [place] visit and that doctor did the liver biopsy because he suspected that lupus or aih could be th issue.  2016  bx minimal features for steatohepatitis mild and few lipoid granulomas/nonspecific and definitive evidence of viral hepatitis or obstructive features not identified.  She was also found to have hyperlipidemia Dr Wagner her primary saw and did not start tx.  She was seeing another gi specialist Dr Jv Davies [facility] saw there and he told her that it was fatty liver and to do diet and gave to do labs in  and then to see her.  She mentioned that Dr Davies was not able to get get the slides sent down for a review but he did get the info.  The doctor in [facility] still thinks that she has lupus but not clear but not on the meds for and was on them for a year and taken off after a year. She does not recall if the meds helped the lfts.  Regarding alcohol, she does not drink much and would have an occ drink or glass of wine and she stopped this all for 2m. Prior light.    Plan: 1. Do the labs on the nortriptyline 4 and 12 weeks and see if makes a difference. 2. She need to follow up with rheum and see how does. 3. Hoping that the labs will settle. Better prior when stopped. 4. She has sadly failed so many meds.  Stressed to pt the need for social distancing and strict handwashing and wearing a mask and to follow any other new or added CDC recommendations as this is an evolving target.  Duration of the visit was 42 min (from 1:55 to 2:37 pm)  by lara which went off and on several times during the visit with greater than 50% of the time spent reviewing the chart and events with the pt and her  and in discussing her labs and tests.   More than half of the face-to-face time used for counseling and coordination of care.   Patient seen today via telehealth by agreement and consent of patient in light of current COVID-19 pandemic. I used video conferencing during the visit. The patient encounter is appropriate and reasonable under the circumstances given the patient's particular presentation at this time. The patient has been advised of the followin) the potential risks and limitations of this mode of treatment (including but not limited to the absence of in-person examination); 2) the right to refuse telehealth services at any point without affecting the right to future care; 3) the right to receive in-person services, included immediately after this consultation if an urgent need arises; 4) information, including identifiable images or information from this telehealth consult, will only be shared in accordance with HIPAA regulations. Any and all of the patient's and/or patient's family member's questions on this issue have been answered. The patient has verbally consented to be treated via telehealth services. The patient has also been advised to contact this office for worsening conditions or problems, and seek emergency medical treatment and/or call 911 if the patient deems either necessary.

## 2020-12-28 ENCOUNTER — LAB OUTSIDE AN ENCOUNTER (OUTPATIENT)
Dept: URBAN - METROPOLITAN AREA TELEHEALTH 2 | Facility: TELEHEALTH | Age: 50
End: 2020-12-28

## 2021-02-23 ENCOUNTER — OFFICE VISIT (OUTPATIENT)
Dept: URBAN - METROPOLITAN AREA CLINIC 91 | Facility: CLINIC | Age: 51
End: 2021-02-23

## 2021-02-28 ENCOUNTER — LAB OUTSIDE AN ENCOUNTER (OUTPATIENT)
Dept: URBAN - METROPOLITAN AREA TELEHEALTH 2 | Facility: TELEHEALTH | Age: 51
End: 2021-02-28

## 2021-03-02 ENCOUNTER — OFFICE VISIT (OUTPATIENT)
Dept: URBAN - METROPOLITAN AREA TELEHEALTH 2 | Facility: TELEHEALTH | Age: 51
End: 2021-03-02

## 2021-04-16 ENCOUNTER — OFFICE VISIT (OUTPATIENT)
Dept: URBAN - METROPOLITAN AREA CLINIC 81 | Facility: CLINIC | Age: 51
End: 2021-04-16
Payer: COMMERCIAL

## 2021-04-16 DIAGNOSIS — K76.89 ABNORMAL LIVER FUNCTION: ICD-10-CM

## 2021-04-16 DIAGNOSIS — K80.20 BILIARY STONE: ICD-10-CM

## 2021-04-16 PROCEDURE — 93975 VASCULAR STUDY: CPT

## 2021-04-16 PROCEDURE — 76705 ECHO EXAM OF ABDOMEN: CPT

## 2021-04-20 ENCOUNTER — TELEPHONE ENCOUNTER (OUTPATIENT)
Dept: URBAN - METROPOLITAN AREA CLINIC 92 | Facility: CLINIC | Age: 51
End: 2021-04-20

## 2021-04-20 NOTE — HPI-TODAY'S VISIT:
Alissa Oshea,  April 16, 2021 ultrasound shows sent to me today.  Pancreas was sonographically unremarkable.  Liver was normal in size and homogeneous in echotexture.  No fatty change was seen.  Multiple gallstones seen in gallbladder measuring up to 1.3 cm.  No cholecystitis signs.  No duct dilation was seen with a common bile duct 3.2 mm.  Right kidney appeared to be unremarkable by ultrasound measuring 10 cm.  The spleen was normal at 10.2 cm. Dr Sosa

## 2021-04-26 ENCOUNTER — OFFICE VISIT (OUTPATIENT)
Dept: URBAN - METROPOLITAN AREA TELEHEALTH 2 | Facility: TELEHEALTH | Age: 51
End: 2021-04-26
Payer: COMMERCIAL

## 2021-04-26 DIAGNOSIS — Z79.899 HIGH RISK MEDICATION USE: ICD-10-CM

## 2021-04-26 DIAGNOSIS — R74.8 ABNORMAL LIVER ENZYMES: ICD-10-CM

## 2021-04-26 DIAGNOSIS — K71.9 DRUG-INDUCED LIVER INJURY: ICD-10-CM

## 2021-04-26 DIAGNOSIS — M33.90 DERMATOMYOSITIS: ICD-10-CM

## 2021-04-26 PROCEDURE — 99214 OFFICE O/P EST MOD 30 MIN: CPT

## 2021-04-26 RX ORDER — HYDROXYCHLOROQUINE SULFATE 200 MG/1
1 TABLET TABLET ORAL
Refills: 0 | Status: ACTIVE | COMMUNITY
Start: 1900-01-01

## 2021-04-26 RX ORDER — PREDNISONE 5 MG/1
1 TABLET TABLET ORAL ONCE A DAY
Refills: 0 | Status: ACTIVE | COMMUNITY
Start: 1900-01-01

## 2021-04-26 RX ORDER — AZATHIOPRINE 50 MG/1
3 TABLET ORAL
Status: ACTIVE | COMMUNITY

## 2021-04-26 RX ORDER — NORTRIPTYLINE HYDROCHLORIDE 25 MG/1
1 CAPSULE CAPSULE ORAL ONCE A DAY
Status: ACTIVE | COMMUNITY

## 2021-04-26 RX ORDER — FLUOXETINE HYDROCHLORIDE 40 MG/1
TAKE 2 CAPSULES (80 MG) BY ORAL ROUTE ONCE DAILY IN THE MORNING CAPSULE ORAL 1
Qty: 0 | Refills: 0 | Status: ACTIVE | COMMUNITY
Start: 1900-01-01

## 2021-04-26 NOTE — HPI-OTHER HISTORIES
This is a scheduled follow-up appointment for this patient, a 50 year old  female, after a previous visit Dec 2020 for an evaluation for elevated liver enzymes and and history of liver biopsy.  A copy of this document will be sent to the referring provider.   She has done the covid 19 vaccine.  The patient reports a personal history of no other habits that could cause liver damage.   Chart review and pt visit review:   March 10 2021 na 139 and k 3.7 and cl 104 ad co2 26 and glu 99 and cr 0.79 and alb 4.0 and tb 0.5 and ca 9.6 and ast 81 and alt 98 and alk 90 aldolase 7.5 and wbc 5.7 hg 10.2 and plat 237. mcv 89.1 and neutrophil 4.68.  esr 30.   2021 ultrasound shows sent to me today.  Pancreas was sonographically unremarkable.  Liver was normal in size and homogeneous in echotexture.  No fatty change was seen.  Multiple gallstones seen in gallbladder measuring up to 1.3 cm.  No cholecystitis signs.  No duct dilation was seen with a common bile duct 3.2 mm.  Right kidney appeared to be unremarkable by ultrasound measuring 10 cm.  The spleen was normal at 10.2 cm.  She says that she saw neurologist a few weeks ago due to leg pain and she says that she saw them and told re the head issues and wants her to do mri of spine and he wants to do mri of brain also. May 22.  She is still on the nortryptilline.  Labs staying up and so we need to consider to do bx again to just confirm what this is. We suspect dili.   Erin printed  labs: na 138 and k 3.8 and cl 105 and co2 23 and glu 136 elevated. bun 11 and cr 0,90. Alb 3.7 and tb 1.1 slightly up but not fractionated, ca 9.5. ast 76 and alt 43 and alk 109 and cpk 96. vit d 76. wbc 4 hg 10.4 little low and mcv 98.8 little up and plat 284. Neutrophils 65% and lymph 29%. ESR 58 elevated still. crp 8.6. mono screen negative.  Nov 10 ast 77 and alt 52 and alk 92 and tb 1.0. hg 10.0.  Gaby 3 2020 ast 84 and alt 122 and alk  75 and tb 0.3.   Edward 3 2020 ast 67 and alt 101 and alk 103 and tb 0.3.   She is back on her headache medicine: noryptiline and failing topamax and back on the 25mg po qd.   and she feel that the topamax did not work for her and so they placed her back on this.   Oct 2020: U.s: Cholelithiasis (gallstones) seen but no biliary dilatation seen with common duct 4mm.  No splenomegaly seen and spleen 11.6cm normal. Liver normal echogenicity. No liver lesions.  Pancreas obscured by overlying structures. Right Kidney measures 10.1 cm. Normal echogenicity with no hydronephrosis. Left Kidney measures 10.4 cm. Normal echogenicity with no hydronephrosis. Liver vessels patent.    Document Type:	US Abdomen Complete Document Date:	2020 07:58  Document Status:	Auth (Verified) Document Title:	US Abdomen Complete Performed By:	Consuelo Alcantara  Verified By:	Consuelo Alcantara on 2020 08:44  Encounter info:	[id number], [facility], Single Visit OP, 10/7/2020 - 10/7/2020   * Final Report *  Reason For Exam ASSESS LIVER AND SPLEEN AND VESSELS AND TO ASSESS GB AND POSSIBEL STONES VS POLYPSS  REPORT EXAM: US Abdomen Doppler Complete, US Abdomen Complete  CLINICAL INDICATION: ASSESS LIVER AND SPLEEN AND VESSELS AND TO ASSESS GB AND POSSIBEL STONES VS POLYPSS.  TECHNIQUE: Grayscale, pulsed wave and color Doppler sonography of the upper abdomen were performed. ESRC.3.7.1  COMPARISON: 2020  FINDINGS:  Liver: Normal echogenicity. No lesions.  Bile Ducts: No dilated intrahepatic biliary radicles. Common duct measures 4 mm.  Gallbladder: Cholelithiasis. Scruggs's sign is negative.  Pancreas: Obscured by overlying structures.  Right Kidney: Measures 10.1 cm. Normal echogenicity. No hydronephrosis.  Left Kidney: Measures 10.4 cm. Normal echogenicity. No hydronephrosis.  Spleen: Measures 11.6 cm.  Aorta: Normal caliber where imaged.  IVC: Normal proximally, not imaged distally.   Hepatic Veins: Normal.  Portal Veins: Hepatopetal flow. 27 cm/s.  Hepatic Arteries: Peak systolic velocity 92 cm/s. Resistive index 0.6.  Other: None.  IMPRESSION: 1. Cholelithiasis. No biliary dilatation. 2. No splenomegaly.  Signature Line *** Final ***  Electronically Signed By:  Consuelo Alcantara on  10/07/2020 08:44  Dictated by:  Consuelo Alcantara   Spoke for gallstones no real therapy if not causing problems and so typcially recommend low fat diet and watch for symptoms and most people won't need a surgery but she does need to be aware of it.   20  glu 111 and bun 15 and cr 0.85 and na 141 and k 4.2 and ca 9.2 and alb 3.9  tb 0.5 and alk 103 and ast 74 and alt 66. Stopped the nortriptyline when saw labs.  July labs said better. alt 48 and ast 63. tsh 1.34 and estr 39 and chol 196 and trg 57 and hdl 54 and ldl 131 and wbc 4.7 hg 10.2 and plat 392 and glu 72 and cr 0.83  ma 142 and k 4.1 and alb 3.9 and tb 0.7 and alk 69 and ast 63 and alt 48.   Gaby 3 2020  [place] na 139 k 4.3 and cl 104 and co2 26 and glu 140 and bun 15 and cr 0.81 and alb 3.9 and tb 0.3 and ca 9.9 and ast 84 and alt 122 and alk 75.  cpk 40 and keppra 33 and wbc 6.8 and hg 10,3 and plat 365.  esr 26. On the nortriptyline and same dose.  Dr Kumar saw for the legs and to do back mri and brain.  She was given pregabalin and she had side effects and swollen face and neck from it and so she had to tell him re this.   Addendum by Lou Parker on 2020 13:46:33 (Verified) She had 2 episodes of hemoptysis yesterday-1 was while brushing her teeth but denies it from her giorgi.  I discussed need to immediately assess this-she had small pulmonary nodules on her prior CT chest from 2020 noted as insignificant without a history smoking history.  However, I will now repeat given this possible hemoptysis.  She was somewhat resistant to obtaining the CT chest and wanted to wait to see if the hemoptysis recurred, and I discussed that she needs to schedule it if so as well as let me know.  She will go to emergency room if the hemoptysis worsens.  She was also noted to have mild leukopenia, and she will follow-up next week for repeat bloodwork.  I discussed with her that I believe that her tiredness and feeling unwell is from the Topamax, and she will reach out to neurology for possible other therapies for her migraines. Signature Line Electronically Signed by: Lou Parker MD on 20.01:48 PM  She did telemed with Dr Kumar and placed on topamax and she was off the nortryptiline sept to now dec 1 2020.  Oct 14 2020 visit neurology: 49 year old female who is referred for evaluation of headaches and to find alternative to nortriptyline. Her limited neurologic examination is normal. Her headaches are consistent with migraines.  headaches- migraines. Well controlled with nortriptyline in the past, however this has been deemed cause for liver injury.  - well controlled on topamax. She will continue topamax, she will try 25mg a day instead of 50mg; alternatives to consider include possibly memantine, botox. will avoid propranolol given depression history  - rizatriptan prn  seizures- she wishes to continue medication for now. No seizures since ~  - keppra 750mg bid  - she reports normal MRI brain in   leg discomfort- possible nocturnal leg cramps.  - well managed with gabapentin 1600mg at bedtime  - discussed B complex, leg exercises  - may consider tizanidine prn  follow up in 11 months  Prior saw Dr Kumar: [place] 2020: Assessment/Plan    49 year old female who is referred for evaluation of headaches and to find alternative to nortriptyline. Her limited neurologic examination is normal. Her headaches are consistent with migraines.   headaches- migraines. Well controlled with nortriptyline, however this has been deemed cause for liver injury. I am uncertain as this would be unusual and her liver enzymes reportedly are near normal despite resumption of nortriptyline the past 3 months  - trial topamax as replacement; alternatives to consider include possibly memantine, botox. will avoid propranolol given depression history  - rizatriptan prn   seizures- she wishes to continue medication for now. No seizures since ~  - keppra 750mg bid  - she reports normal MRI brain in 2020   leg discomfort- possible nocturnal leg cramps. she will trial increasing gabapentin to 1200mg total before bedtime and if not helpful she knows to wean off  - discussed B complex, leg exercises  - may consider tizanidine prn  follow up in 3 months  Gaby 3 2020 na 139 and k 4.3 and cl 104 and co2 26 and glu 140 and cr 0.81 an daklb 3.9 and tb 0.3 and ast 84 and alt 122 and alk 75. cpk 40 and wbc 6.8 hg 10.3 and plat 365. esr 26.   Edward 3 2020 ast 67 and alt 101 and alk 103 and tb 0.3 and wbc 14.5 and hg 11 and plat 406.   2020 factin 6 and ama 2.7 and lkm  less than 1:20.  anad less than1:80 and dsdna 0.8 and sydney neg and scl 100 pos.  b sag neg and b sab neg and b core total neg and hcv ab neg, HIV neg,   She did labs avoiding all social alcohol ast 55 and alt 48. She is still avoiding the alcohol.   Document Type:	CT Abdomen + Pelvis w/ IV Contrast Document Date:	2020 14:35  Document Status:	Auth (Verified) Document Title:	CT Abdomen + Pelvis w/ IV Contrast Performed By:	Jesús Arrieta  Verified By:	Rex Campbell IV on 2020 16:26  Encounter info:	[id number], [facility], Single Visit OP, 2020 - 2020   * Final Report *  Reason For Exam Other; H/o new dermatomyositis, weight loss;Other  REPORT EXAM: CT Abdomen + Pelvis w/ IV Contrast  CLINICAL INDICATION: H/o new dermatomyositis, weight loss.  TECHNIQUE: Following administration of non-ionic IV contrast, postcontrast images through the abdomen and pelvis were obtained. ESRC.1.7.1 If applicable, point-of-care testing was approved following departmental protocol.  COMPARISON: None  FINDINGS:  Lower Thorax: Please see separately dictated report of the chest from the same day for findings above the diaphragm.  Liver: Normal.  Gallbladder/Biliary Tree: Gallstones without acute cholecystitis.  Spleen: Normal.  Pancreas: Normal.  Adrenal Glands: Normal.  Kidneys/Ureters: Normal.  Gastrointestinal: Moderate stool burden.   Bladder: Normal.  Uterus/Adnexa: Leiomyomatous uterus with multiple exophytic fibroids. Left ovarian follicular cyst. Incidental nabothian cyst.  Lymph Nodes: Normal.  Vessels: Normal.  Peritoneum/Retroperitoneum: Normal.  Bones/Soft Tissues: Sclerotic focus, likely a bone island in the anterior L4 vertebral body (series 12, image 43).  IMPRESSION:  1.  Cholelithiasis without findings of acute cholecystitis. 2.  Leiomyomatous uterus.  The images were reviewed and interpreted by Rex Campbell MD.  Signature Line *** Final ***  Electronically Signed By:  Rex Campbell IV on  2020 16:26  Dictated by:  Jesús Arrieta   Oct 28 2019 u/s liver mildly inc echo and sugg hepatic steatosis and no focal leisons and multiple echogenic stones in gb up to 1.2cm. GB wall not thickened and no pericholecystic fluid. GB wall no tthickened and bile ducts not dilated and cbd 4mm. Imaged pancreas portions unremarkable and spleen not enlarged and no ascites and right kidney 9.6cm. No hydronephrosis. Vessels patent.   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 Mod to severe balloning and no glycogenated nuclei and no sig protal inflammation. Bile duct unremarkable and no globules. ther recommend clincial correlation to aassess for alcohol or other med/supplement related vs metabolic fatty liver. No features of autoimmune hepatitis.  oct 14 2019 igg 2078 and igm 186.   2019 wbc 7.6 hg 11.2 plar 350 and glu88 and cr 0.79 and na 137 and k 4.2 and ca 10 and alb 4.1 and tb 0.3 and alk 151 and ast 105 and alt 101 and iron sat low 8% and alpha one 180 and Mm and hcv ab neg and b not immune less than3.1 and asma neg and ama neg and andres pos. antichromatin 1.2 (drug induced lupus) ceruloplasmin 29.7 and ferritn 50 and b sag neg and b core total neg and hep a neg.   Saw rheum Dr Siddiqi prior and did trial of steroids and felt ill and so being weaned off. She is Rheumatology at [place].  Re the skin she is still on meds for this. Sees her .  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  The new rash on her arms does not appear classic for DM and appears more as bruising, but per pt it is extremely pruritic and only hyperpigmented after the initial erythematous phase resolves.  It is thus most likely from DM, however, a drug-rash from imuran is also a possibility.  If the rash worsenes or she develops other symptoms/oral rash before next week, she will stop the imuran and alert me.  She will present to a [facility] to have a CBC with diff, CMP, ESR, CRP, and CPK done.  She will then increase the prendisone to 20mg daily and use clobetasol cream to see if this helps, and I will call her next week to see- if pred doesn't help, the imuran may need to be switched.  -Unclear etiology of her liver fibrosis.  I called her gastroenterologist but was unable to touch base.  -Continue imuran 100mg daily for now  -Continue plaquenil 300mg daily- we previously discussed stopping since she is unsure if it is helping, but she will continue for now and revisit at a future visit  -Prednisone  -CT chest/A/P in 2020 without any underlying malignancy  -I discussed that she needs to be up-to-date on age-appropriate cancer screening such as pap smears and mammograms.  She underwent a colonoscopy within the last 10 years.  -She was recently taken off nortriptyline by gastroenterology since it was thought that it could be a drug-induced hepatitis from the nortriptyline.  She would like to establish care with neurology for other treatments for her migraines. [1]   Nortrytline per livertox: Hepatotoxicity Liver test abnormalities have been reported to occur in up to 16% of patients being treated with tricyclic antidepressants, but elevations are uncommonly above 3 times the upper limit of normal. The aminotransferase abnormalities are usually mild, asymptomatic and transient, reversing even with continuation of medication. Rare instances of clinically apparent acute liver injury have been reported due to nortriptyline. The onset of jaundice is usually within 2 to 3 months of starting nortriptyline and the predominant enzyme pattern has been hepatocellular. Several acute instances of nortriptyline hepatotoxicity with marked elevations in serum aminotransferase levels and acute liver failure have been described. Signs and symptoms of hypersensitivity and autoimmunity are usually not present.  Mechanism of Injury The mechanism by which nortriptyline causes serum aminotransferase elevations and acute liver injury is not known. It undergoes extensive hepatic metabolism and a possible cause of liver injury is production of a toxic intermediate of metabolism.     2019 wbc 7.6 hg 11.2 plat 350 and glu 88 and cr 0.79 and na 137 and ak 4.2 and cl 103 and alb 4.1 and tb 0.3 and alk 151 ast 105 and alt 101. iron sat 8% and she is known to this. alpha one 180 and MM,  hcv ab neg, hep b not immune less than3.1 and asma neg 16 and ama  less than 20 and andres positive.  antichromatin 1.1 (0-0.9) related to drug induced lupus. ceruloplasmin 29.7 and ferrtin 50 and b sag neg and b core total neg and hep a neg.  Aug 12 2019 ahi: gallstones present in the gb. Adenomyomatosis seen gb fundus.  No gb wall thickening and no fluid aroudn the gb and murphys sign. CBd not dilated and liver and right kdiney and observed pancraes unremarkable.  No doppler.  She did telemed today with her  and we reviewed this.  RECAP: She prior brought in records from [facility] [place].  2016 visit to Dr Stuart Francisco: mentions inflammatory polyarthropathy and possible SLE and AIH and other dermatitis and adjustment disorder with depressed mood.  Lists her on Pred 10mg 1.5 po qd, plaquenil 200mg po qd, zithromax 500mg po qd, prozac 40mg 2po qd, nortriptyline 25mg po qd at the time. She was reported to be doing 150 min aerobic exercise per week.  Counseled re a low fat and low card diet. Weigth was 140 pounds and height 5'1" and bmi 26.43.  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 She was having a facial rash at the time.  She says the seizure diagnosis was 3 yrs ago but has had 3 seizures and on the keppra and she says thatshe was kept on it as scan was abnormal and the doctor who is seeing at [facility]. Dr Ferrer. She says he sees her about once a year or so. Told she had white spots and saw white spots on the scans and saw [place] and [place] and no changes and last scan 3 yrs ago.  She has been on keppra along the way for this.  SHe says the labs were abnormal pre the possible lupus and mctd and inflammatory polyarthropathy. She says a doctor in [place] that she had this diagnosed.   The doctor in [place] was a friend of theirs but they live here and  worked in [place] at the time.  Her mom passed and she had post partum depression and had facial acne/rash issues and says that  saw local doctor and they gave abx and nothing helping.  She saw a lot of doctors in [place] and in [place] and nobody could help.  She says the doctors noted the lfts but 3-4 yrs now and that was before that. She says told up and to do diet.  The bx was done in [place] visit and that doctor did the liver biopsy because he suspected that lupus or aih could be th issue.  2016  bx minimal features for steatohepatitis mild and few lipoid granulomas/nonspecific and definitive evidence of viral hepatitis or obstructive features not identified.  She was also found to have hyperlipidemia Dr Wagner her primary saw and did not start tx.  She was seeing another gi specialist Dr Jv Davies with [facility] saw there and he told her that it was fatty liver and to do diet and gave to do labs in  and then to see her.  She mentioned that Dr Davies was not able to get get the slides sent down for a review but he did get the info.  The doctor in [facility] still thinks that she has lupus but not clear but not on the meds for and was on them for a year and taken off after a year. She does not recall if the meds helped the lfts.  Regarding alcohol, she does not drink much and would have an occ drink or glass of wine and she stopped this all for 2m. Prior light.    Plan: 1. Do think that we have to consider the redo bx when settled hopefully on steady medidine regimine so that biopsy reflects what is going on. 2. Prior bx  and reread and showed us that now aih and not fatty liver and could be medicine and she cannot liver without nortryptiline. 3. She is considering botox. 4. She will see us in  and if labs not better plan bx. 5. She has sadly failed so many meds.  Stressed to pt the need for social distancing and strict handwashing and wearing a mask and to follow any other new or added CDC recommendations as this is an evolving target.  Duration of the visit was 30 min with 4m in of chart review from [facility] and then 26 min by lara by lara  with greater than 50% of the time spent reviewing the chart and events with the pt and her  and in discussing her labs and tests.   More than half of the face-to-face time used for counseling and coordination of care.   Patient seen today via telehealth by agreement and consent of patient in light of current COVID-19 pandemic. I used video conferencing during the visit. The patient encounter is appropriate and reasonable under the circumstances given the patient's particular presentation at this time. The patient has been advised of the followin) the potential risks and limitations of this mode of treatment (including but not limited to the absence of in-person examination); 2) the right to refuse telehealth services at any point without affecting the right to future care; 3) the right to receive in-person services, included immediately after this consultation if an urgent need arises; 4) information, including identifiable images or information from this telehealth consult, will only be shared in accordance with HIPAA regulations. Any and all of the patient's and/or patient's family member's questions on this issue have been answered. The patient has verbally consented to be treated via telehealth services. The patient has also been advised to contact this office for worsening conditions or problems, and seek emergency medical treatment and/or call 911 if the patient deems either necessary.

## 2021-04-26 NOTE — EXAM-PHYSICAL EXAM
Telemed: Gen: no distress Eyes: anicteric and normal lids Mouth: no thrush CV: no chest pain Lungs: no wheezes Abd: No tenderness or pain reported Ext: no edema Neuro: Responsive and no distress. Headaches an issue for her and getting over a medicine reaction

## 2021-07-12 ENCOUNTER — LAB OUTSIDE AN ENCOUNTER (OUTPATIENT)
Dept: URBAN - METROPOLITAN AREA TELEHEALTH 2 | Facility: TELEHEALTH | Age: 51
End: 2021-07-12

## 2021-07-28 ENCOUNTER — TELEPHONE ENCOUNTER (OUTPATIENT)
Dept: URBAN - METROPOLITAN AREA CLINIC 92 | Facility: CLINIC | Age: 51
End: 2021-07-28

## 2021-07-28 ENCOUNTER — OFFICE VISIT (OUTPATIENT)
Dept: URBAN - METROPOLITAN AREA TELEHEALTH 2 | Facility: TELEHEALTH | Age: 51
End: 2021-07-28

## 2021-07-28 PROBLEM — 238131007: Status: ACTIVE | Noted: 2020-07-23

## 2021-07-28 PROBLEM — 417662000: Status: ACTIVE | Noted: 2020-07-21

## 2021-07-28 PROBLEM — 235919008: Status: ACTIVE | Noted: 2020-07-21

## 2021-07-28 PROBLEM — 165850001: Status: ACTIVE | Noted: 2020-07-21

## 2021-07-28 PROBLEM — 425007008: Status: ACTIVE | Noted: 2020-07-21

## 2021-07-28 PROBLEM — 55822004: Status: ACTIVE | Noted: 2020-07-21

## 2021-07-28 PROBLEM — 396230008: Status: ACTIVE | Noted: 2020-07-23

## 2021-07-28 PROBLEM — 427399008: Status: ACTIVE | Noted: 2020-07-21

## 2021-07-28 PROBLEM — 231504006: Status: ACTIVE | Noted: 2020-07-21

## 2021-07-28 PROBLEM — 36923009: Status: ACTIVE | Noted: 2020-07-21

## 2021-07-28 RX ORDER — AZATHIOPRINE 50 MG/1
3 TABLET ORAL
Status: ACTIVE | COMMUNITY

## 2021-07-28 RX ORDER — FLUOXETINE HYDROCHLORIDE 40 MG/1
TAKE 2 CAPSULES (80 MG) BY ORAL ROUTE ONCE DAILY IN THE MORNING CAPSULE ORAL 1
Qty: 0 | Refills: 0 | Status: ACTIVE | COMMUNITY
Start: 1900-01-01

## 2021-07-28 RX ORDER — PREDNISONE 5 MG/1
1 TABLET TABLET ORAL ONCE A DAY
Refills: 0 | Status: ACTIVE | COMMUNITY
Start: 1900-01-01

## 2021-07-28 RX ORDER — NORTRIPTYLINE HYDROCHLORIDE 25 MG/1
1 CAPSULE CAPSULE ORAL ONCE A DAY
Status: ACTIVE | COMMUNITY

## 2021-07-28 RX ORDER — HYDROXYCHLOROQUINE SULFATE 200 MG/1
1 TABLET TABLET ORAL
Refills: 0 | Status: ACTIVE | COMMUNITY
Start: 1900-01-01

## 2021-07-28 NOTE — HPI-TODAY'S VISIT:
This is a scheduled follow-up appointment for this patient, a 50 year old  female, after a previous visit April 2021 or an evaluation for elevated liver enzymes and and history of liver biopsy.  A copy of this document will be sent to the referring provider.   She has done the covid 19 vaccine.  The patient reports a personal history of no other habits that could cause liver damage.   Chart review and pt visit review:   Review of [facility] notes since her last visit of April:  June 30 labs showed sodium 138 potassium 3.4 glucose 73 BUN of 12 creatinine 0.88 albumin 3.4 bilirubin 1.2 calcium 9.1 ammonia 28 AST 81 ALT 49 alk phos 159.  Magnesium 3.4 white count 4.2 hemoglobin 9.2 plate count 243 .7.  Neutrophils 75%.  Sed rate elevated at 67 leukocyte alkaline phosphatase 226.  IgG blood 1655. June 23 labs show AST 74 ALT 48 alk phos 143 total bili 1.0. Rheumatology note June 23 reviewed: Stated that she has elevated alkaline phosphatase and elevated inflammatory markers.  Unclear if it was from bone or liver and a bone specific alkaline phosphatase should be checked.  Worsening anemia and leukopenia noted so she was being referred to hematology for that as well.  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  It was felt that none of her abnormal blood work or from elevated inflammatory markers were from a rheumatologic disease. Has her listed as being on baclofen 10 mg 3 times a day hydroxychloroquine 300 mg once a day azathioprine 50 mg a day Keppra 750 mg 2 p.o. daily nortriptyline 25 mg at night fluoxetine 80 mg a day rizatriptan 10 mg as needed and triamcinolone topical. They state that her main concern is severe fatigue and abnormal gait and her strength was felt to be normal.  She was seeing neurology for this.  MRI apparently showed some white matter changes.  She was reminded about her imaging and colon screening by the rheumatologist. June 30 note from neurology: She had the abnormal MRI of the brain that they were concerned could represent demyelinating disease.  March 10 2021 na 139 and k 3.7 and cl 104 ad co2 26 and glu 99 and cr 0.79 and alb 4.0 and tb 0.5 and ca 9.6 and ast 81 and alt 98 and alk 90 aldolase 7.5 and wbc 5.7 hg 10.2 and plat 237. mcv 89.1 and neutrophil 4.68.  esr 30.   April 16, 2021 ultrasound shows sent to me today.  Pancreas was sonographically unremarkable.  Liver was normal in size and homogeneous in echotexture.  No fatty change was seen.  Multiple gallstones seen in gallbladder measuring up to 1.3 cm.  No cholecystitis signs.  No duct dilation was seen with a common bile duct 3.2 mm.  Right kidney appeared to be unremarkable by ultrasound measuring 10 cm.  The spleen was normal at 10.2 cm.  She says that she saw neurologist a few weeks ago due to leg pain and she says that she saw them and told re the head issues and wants her to do mri of spine and he wants to do mri of brain also. May 22.  She is still on the nortryptilline.  Labs staying up and so we need to consider to do bx again to just confirm what this is. We suspect dili.   Erin printed Nov 24 labs: na 138 and k 3.8 and cl 105 and co2 23 and glu 136 elevated. bun 11 and cr 0,90. Alb 3.7 and tb 1.1 slightly up but not fractionated, ca 9.5. ast 76 and alt 43 and alk 109 and cpk 96. vit d 76. wbc 4 hg 10.4 little low and mcv 98.8 little up and plat 284. Neutrophils 65% and lymph 29%. ESR 58 elevated still. crp 8.6. mono screen negative.  Nov 10 ast 77 and alt 52 and alk 92 and tb 1.0. hg 10.0.  Gaby 3 2020 ast 84 and alt 122 and alk  75 and tb 0.3.   Edward 3 2020 ast 67 and alt 101 and alk 103 and tb 0.3.   She is back on her headache medicine: noryptiline and failing topamax and back on the 25mg po qd.   and she feel that the topamax did not work for her and so they placed her back on this.   Oct 2020: U.s: Cholelithiasis (gallstones) seen but no biliary dilatation seen with common duct 4mm.  No splenomegaly seen and spleen 11.6cm normal. Liver normal echogenicity. No liver lesions.  Pancreas obscured by overlying structures. Right Kidney measures 10.1 cm. Normal echogenicity with no hydronephrosis. Left Kidney measures 10.4 cm. Normal echogenicity with no hydronephrosis. Liver vessels patent.    Document Type:	US Abdomen Complete Document Date:	October 07, 2020 07:58  Document Status:	Auth (Verified) Document Title:	US Abdomen Complete Performed By:	Consuelo Alcantara  Verified By:	Consuelo Alcantara on October 07, 2020 08:44  Encounter info:	[id number], [facility], Single Visit OP, 10/7/2020 - 10/7/2020   * Final Report *  Reason For Exam ASSESS LIVER AND SPLEEN AND VESSELS AND TO ASSESS GB AND POSSIBEL STONES VS POLYPSS  REPORT EXAM: US Abdomen Doppler Complete, US Abdomen Complete  CLINICAL INDICATION: ASSESS LIVER AND SPLEEN AND VESSELS AND TO ASSESS GB AND POSSIBEL STONES VS POLYPSS.  TECHNIQUE: Grayscale, pulsed wave and color Doppler sonography of the upper abdomen were performed. ESRC.3.7.1  COMPARISON: February 21, 2020  FINDINGS:  Liver: Normal echogenicity. No lesions.  Bile Ducts: No dilated intrahepatic biliary radicles. Common duct measures 4 mm.  Gallbladder: Cholelithiasis. Scruggs's sign is negative.  Pancreas: Obscured by overlying structures.  Right Kidney: Measures 10.1 cm. Normal echogenicity. No hydronephrosis.  Left Kidney: Measures 10.4 cm. Normal echogenicity. No hydronephrosis.  Spleen: Measures 11.6 cm.  Aorta: Normal caliber where imaged.  IVC: Normal proximally, not imaged distally.   Hepatic Veins: Normal.  Portal Veins: Hepatopetal flow. 27 cm/s.  Hepatic Arteries: Peak systolic velocity 92 cm/s. Resistive index 0.6.  Other: None.  IMPRESSION: 1. Cholelithiasis. No biliary dilatation. 2. No splenomegaly.  Signature Line *** Final ***  Electronically Signed By:  Consuelo Alcantara on  10/07/2020 08:44  Dictated by:  Consuelo Alcantara   Spoke for gallstones no real therapy if not causing problems and so typcially recommend low fat diet and watch for symptoms and most people won't need a surgery but she does need to be aware of it.   9-4-20  glu 111 and bun 15 and cr 0.85 and na 141 and k 4.2 and ca 9.2 and alb 3.9  tb 0.5 and alk 103 and ast 74 and alt 66. Stopped the nortriptyline when saw labs.  July labs said better. alt 48 and ast 63. tsh 1.34 and estr 39 and chol 196 and trg 57 and hdl 54 and ldl 131 and wbc 4.7 hg 10.2 and plat 392 and glu 72 and cr 0.83  ma 142 and k 4.1 and alb 3.9 and tb 0.7 and alk 69 and ast 63 and alt 48.   Gaby 3 2020  [facility] na 139 k 4.3 and cl 104 and co2 26 and glu 140 and bun 15 and cr 0.81 and alb 3.9 and tb 0.3 and ca 9.9 and ast 84 and alt 122 and alk 75.  cpk 40 and keppra 33 and wbc 6.8 and hg 10,3 and plat 365.  esr 26. On the nortriptyline and same dose.  Dr Kumar saw for the legs and to do back mri and brain.  She was given pregabalin and she had side effects and swollen face and neck from it and so she had to tell him re this.   Addendum by Lou Parker on November 19, 2020 13:46:33 (Verified) She had 2 episodes of hemoptysis yesterday-1 was while brushing her teeth but denies it from her giorgi.  I discussed need to immediately assess this-she had small pulmonary nodules on her prior CT chest from 2/2020 noted as insignificant without a history smoking history.  However, I will now repeat given this possible hemoptysis.  She was somewhat resistant to obtaining the CT chest and wanted to wait to see if the hemoptysis recurred, and I discussed that she needs to schedule it if so as well as let me know.  She will go to emergency room if the hemoptysis worsens.  She was also noted to have mild leukopenia, and she will follow-up next week for repeat bloodwork.  I discussed with her that I believe that her tiredness and feeling unwell is from the Topamax, and she will reach out to neurology for possible other therapies for her migraines. Signature Line Electronically Signed by: Lou Parker MD on 11.19.20.01:48 PM  She did telemed with Dr Kumar and placed on topamax and she was off the nortryptiline sept to now dec 1 2020.  Oct 14 2020 visit neurology: 49 year old female who is referred for evaluation of headaches and to find alternative to nortriptyline. Her limited neurologic examination is normal. Her headaches are consistent with migraines.  headaches- migraines. Well controlled with nortriptyline in the past, however this has been deemed cause for liver injury.  - well controlled on topamax. She will continue topamax, she will try 25mg a day instead of 50mg; alternatives to consider include possibly memantine, botox. will avoid propranolol given depression history  - rizatriptan prn  seizures- she wishes to continue medication for now. No seizures since ~2016  - keppra 750mg bid  - she reports normal MRI brain in 2020  leg discomfort- possible nocturnal leg cramps.  - well managed with gabapentin 1600mg at bedtime  - discussed B complex, leg exercises  - may consider tizanidine prn  follow up in 11 months  Prior saw Dr Kumar: [facility] July 14 2020: Assessment/Plan    49 year old female who is referred for evaluation of headaches and to find alternative to nortriptyline. Her limited neurologic examination is normal. Her headaches are consistent with migraines.   headaches- migraines. Well controlled with nortriptyline, however this has been deemed cause for liver injury. I am uncertain as this would be unusual and her liver enzymes reportedly are near normal despite resumption of nortriptyline the past 3 months  - trial topamax as replacement; alternatives to consider include possibly memantine, botox. will avoid propranolol given depression history  - rizatriptan prn   seizures- she wishes to continue medication for now. No seizures since ~2016  - keppra 750mg bid  - she reports normal MRI brain in 2020   leg discomfort- possible nocturnal leg cramps. she will trial increasing gabapentin to 1200mg total before bedtime and if not helpful she knows to wean off  - discussed B complex, leg exercises  - may consider tizanidine prn  follow up in 3 months  Gaby 3 2020 na 139 and k 4.3 and cl 104 and co2 26 and glu 140 and cr 0.81 an daklb 3.9 and tb 0.3 and ast 84 and alt 122 and alk 75. cpk 40 and wbc 6.8 hg 10.3 and plat 365. esr 26.   Edward 3 2020 ast 67 and alt 101 and alk 103 and tb 0.3 and wbc 14.5 and hg 11 and plat 406.   Jan 2020 factin 6 and ama 2.7 and lkm  less than 1:20.  anad less than1:80 and dsdna 0.8 and sydney neg and scl 100 pos.  b sag neg and b sab neg and b core total neg and hcv ab neg, HIV neg,   She did labs avoiding all social alcohol ast 55 and alt 48. She is still avoiding the alcohol.   Document Type:	CT Abdomen + Pelvis w/ IV Contrast Document Date:	February 21, 2020 14:35  Document Status:	Auth (Verified) Document Title:	CT Abdomen + Pelvis w/ IV Contrast Performed By:	Jesús Arrieta  Verified By:	Rex Campbell IV on February 21, 2020 16:26  Encounter info:	[id number], [facility], Single Visit OP, 2/21/2020 - 2/21/2020   * Final Report *  Reason For Exam Other; H/o new dermatomyositis, weight loss;Other  REPORT EXAM: CT Abdomen + Pelvis w/ IV Contrast  CLINICAL INDICATION: H/o new dermatomyositis, weight loss.  TECHNIQUE: Following administration of non-ionic IV contrast, postcontrast images through the abdomen and pelvis were obtained. ESRC.1.7.1 If applicable, point-of-care testing was approved following departmental protocol.  COMPARISON: None  FINDINGS:  Lower Thorax: Please see separately dictated report of the chest from the same day for findings above the diaphragm.  Liver: Normal.  Gallbladder/Biliary Tree: Gallstones without acute cholecystitis.  Spleen: Normal.  Pancreas: Normal.  Adrenal Glands: Normal.  Kidneys/Ureters: Normal.  Gastrointestinal: Moderate stool burden.   Bladder: Normal.  Uterus/Adnexa: Leiomyomatous uterus with multiple exophytic fibroids. Left ovarian follicular cyst. Incidental nabothian cyst.  Lymph Nodes: Normal.  Vessels: Normal.  Peritoneum/Retroperitoneum: Normal.  Bones/Soft Tissues: Sclerotic focus, likely a bone island in the anterior L4 vertebral body (series 12, image 43).  IMPRESSION:  1.  Cholelithiasis without findings of acute cholecystitis. 2.  Leiomyomatous uterus.  The images were reviewed and interpreted by Rex Campbell MD.  Signature Line *** Final ***  Electronically Signed By:  Rex Campbell IV on  02/21/2020 16:26  Dictated by:  Jesús Arrieta   Oct 28 2019 u/s liver mildly inc echo and sugg hepatic steatosis and no focal leisons and multiple echogenic stones in gb up to 1.2cm. GB wall not thickened and no pericholecystic fluid. GB wall no tthickened and bile ducts not dilated and cbd 4mm. Imaged pancreas portions unremarkable and spleen not enlarged and no ascites and right kidney 9.6cm. No hydronephrosis. Vessels patent.   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 Mod to severe balloning and no glycogenated nuclei and no sig protal inflammation. Bile duct unremarkable and no globules. ther recommend clincial correlation to aassess for alcohol or other med/supplement related vs metabolic fatty liver. No features of autoimmune hepatitis.  oct 14 2019 igg 2078 and igm 186.   Sept 26 2019 wbc 7.6 hg 11.2 plar 350 and glu88 and cr 0.79 and na 137 and k 4.2 and ca 10 and alb 4.1 and tb 0.3 and alk 151 and ast 105 and alt 101 and iron sat low 8% and alpha one 180 and Mm and hcv ab neg and b not immune less than3.1 and asma neg and ama neg and andres pos. antichromatin 1.2 (drug induced lupus) ceruloplasmin 29.7 and ferritn 50 and b sag neg and b core total neg and hep a neg.   Saw rheum Dr Siddiqi prior and did trial of steroids and felt ill and so being weaned off. She is Rheumatology at [facility].  Re the skin she is still on meds for this. Sees her June 22.  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  The new rash on her arms does not appear classic for DM and appears more as bruising, but per pt it is extremely pruritic and only hyperpigmented after the initial erythematous phase resolves.  It is thus most likely from DM, however, a drug-rash from imuran is also a possibility.  If the rash worsenes or she develops other symptoms/oral rash before next week, she will stop the imuran and alert me.  She will present to a [facility] to have a CBC with diff, CMP, ESR, CRP, and CPK done.  She will then increase the prendisone to 20mg daily and use clobetasol cream to see if this helps, and I will call her next week to see- if pred doesn't help, the imuran may need to be switched.  -Unclear etiology of her liver fibrosis.  I called her gastroenterologist but was unable to touch base.  -Continue imuran 100mg daily for now  -Continue plaquenil 300mg daily- we previously discussed stopping since she is unsure if it is helping, but she will continue for now and revisit at a future visit  -Prednisone  -CT chest/A/P in 2/2020 without any underlying malignancy  -I discussed that she needs to be up-to-date on age-appropriate cancer screening such as pap smears and mammograms.  She underwent a colonoscopy within the last 10 years.  -She was recently taken off nortriptyline by gastroenterology since it was thought that it could be a drug-induced hepatitis from the nortriptyline.  She would like to establish care with neurology for other treatments for her migraines. [1]   Nortrytline per livertox: Hepatotoxicity Liver test abnormalities have been reported to occur in up to 16% of patients being treated with tricyclic antidepressants, but elevations are uncommonly above 3 times the upper limit of normal. The aminotransferase abnormalities are usually mild, asymptomatic and transient, reversing even with continuation of medication. Rare instances of clinically apparent acute liver injury have been reported due to nortriptyline. The onset of jaundice is usually within 2 to 3 months of starting nortriptyline and the predominant enzyme pattern has been hepatocellular. Several acute instances of nortriptyline hepatotoxicity with marked elevations in serum aminotransferase levels and acute liver failure have been described. Signs and symptoms of hypersensitivity and autoimmunity are usually not present.  Mechanism of Injury The mechanism by which nortriptyline causes serum aminotransferase elevations and acute liver injury is not known. It undergoes extensive hepatic metabolism and a possible cause of liver injury is production of a toxic intermediate of metabolism.     Sept 26 2019 wbc 7.6 hg 11.2 plat 350 and glu 88 and cr 0.79 and na 137 and ak 4.2 and cl 103 and alb 4.1 and tb 0.3 and alk 151 ast 105 and alt 101. iron sat 8% and she is known to this. alpha one 180 and MM,  hcv ab neg, hep b not immune less than3.1 and asma neg 16 and ama  less than 20 and andres positive.  antichromatin 1.1 (0-0.9) related to drug induced lupus. ceruloplasmin 29.7 and ferrtin 50 and b sag neg and b core total neg and hep a neg.  Aug 12 2019 ahi: gallstones present in the gb. Adenomyomatosis seen gb fundus.  No gb wall thickening and no fluid aroudn the gb and murphys sign. CBd not dilated and liver and right kdiney and observed pancraes unremarkable.  No doppler.  She did telemed today with her  and we reviewed this.  RECAP: She prior brought in records from [place], [place].  Sept 7 2016 visit to Dr Stuart Francisco: mentions inflammatory polyarthropathy and possible SLE and AIH and other dermatitis and adjustment disorder with depressed mood.  Lists her on Pred 10mg 1.5 po qd, plaquenil 200mg po qd, zithromax 500mg po qd, prozac 40mg 2po qd, nortriptyline 25mg po qd at the time. She was reported to be doing 150 min aerobic exercise per week.  Counseled re a low fat and low card diet. Weigth was 140 pounds and height 5'1" and bmi 26.43.  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 She was having a facial rash at the time.  She says the seizure diagnosis was 3 yrs ago but has had 3 seizures and on the keppra and she says thatshe was kept on it as scan was abnormal and the doctor who is seeing at [facility]. Dr Ferrer. She says he sees her about once a year or so. Told she had white spots and saw white spots on the scans and [place] and [place] and no changes and last scan 3 yrs ago.  She has been on keppra along the way for this.  SHe says the labs were abnormal pre the possible lupus and mctd and inflammatory polyarthropathy. She says a doctor in [place] that she had this diagnosed.   The doctor in [place] was a friend of theirs but they live here and  worked in [place] at the time.  Her mom passed and she had post partum depression and had facial acne/rash issues and says that  saw local doctor and they gave abx and nothing helping.  She saw a lot of doctors in [place] and in [place] and nobody could help.  She says the doctors noted the lfts but 3-4 yrs now and that was before that. She says told up and to do diet.  The bx was done in [place] visit and that doctor did the liver biopsy because he suspected that lupus or aih could be th issue.  Sept 12 2016  bx minimal features for steatohepatitis mild and few lipoid granulomas/nonspecific and definitive evidence of viral hepatitis or obstructive features not identified.  She was also found to have hyperlipidemia Dr Wagner her primary saw and did not start tx.  She was seeing another gi specialist Dr Jv Davies [facility] saw there and he told her that it was fatty liver and to do diet and gave to do labs in 3m and then to see her.  She mentioned that Dr Davies was not able to get get the slides sent down for a review but he did get the info.  The doctor in [facility] still thinks that she has lupus but not clear but not on the meds for and was on them for a year and taken off after a year. She does not recall if the meds helped the lfts.  Regarding alcohol, she does not drink much and would have an occ drink or glass of wine and she stopped this all for 2m. Prior light.    Plan: 1. Do think that we have to consider the redo bx when settled hopefully on steady medidine regimine so that biopsy reflects what is going on. 2. Prior bx 2016 and reread and showed us that now aih and not fatty liver and could be medicine and she cannot liver without nortryptiline. 3. She is considering botox. 4. She will see us in 3m and if labs not better plan bx. 5. She has sadly failed so many meds.  Stressed to pt the need for social distancing and strict handwashing and wearing a mask and to follow any other new or added CDC recommendations as this is an evolving target.  Duration of the visit was  min with in of chart review from [facility] labs and then 26 min by lara by lara  with greater than 50% of the time spent reviewing the chart and events with the pt and her  and in discussing her labs and tests.

## 2021-07-28 NOTE — HPI-TODAY'S VISIT:
Vero Oshea, July 19 2021 labs: Glu 120 elevated and maybe not fasting. Bun 11 and cr 0.82 and na 135 and k 3.5 and cl 101 and co2 20 and ca 9.4. alb 3.3 little low. Tb 1.8 elevated. Alk 180 and ast 93 and alt 51. March labs ast 74 and alt 83.  Iron 37%.  Hcv ab neg. folate 5.5. HIV neg. LDH up 315. Haptoglobin  less than 10 and ferritin 89, b sag neg, crp 32 elevated, b core total neg.  July 21 tb 2.4 and alk 181 and ast 100 and alt 48. Hodan neg. anti smith neg.vit d 81.9. wbc 2.7 hg 8.5 plat 205.iron sat 49%. Ferritin 65. Dr Sosa

## 2021-08-16 ENCOUNTER — TELEPHONE ENCOUNTER (OUTPATIENT)
Dept: URBAN - METROPOLITAN AREA CLINIC 86 | Facility: CLINIC | Age: 51
End: 2021-08-16

## 2021-08-22 ENCOUNTER — TELEPHONE ENCOUNTER (OUTPATIENT)
Dept: URBAN - METROPOLITAN AREA CLINIC 92 | Facility: CLINIC | Age: 51
End: 2021-08-22

## 2021-08-22 NOTE — HPI-TODAY'S VISIT:
Dear Jose Oshea, Thanks for sending the info. Here is my review of the info: "Review of [facility] records sent in by patient. This is a discharge summary from July 27 through August 5, 2021. Dx listed for pt 1.	Streptococcus paralysis bacteremia.   2.	Suspected bibasilar pneumonia. 3.	Pancytopenia without neutropenia. 4.	Generalized muscle weakness. 5.	Dysphagia. 6.	Abnormal liver enzymes. 7.	History of seizure disorder. 8.	Mild hyponatremia. 9.	Hypokalemia. 10.	Metabolic acidosis.  They mention that during the hospital course that she had presented to the emergency room for weakness as well as shortness of breath and was admitted for acute hypoxic respiratory failure secondary to right lower lobe pneumonia along with generalized weakness with proximal and distal muscle involvement. CT of the chest abdomen and pelvis revealed right lower lobe atelectasis and a superimposed pneumonia with moderate right pleural effusion that they thought was a parapneumonic effusion as well as patchy groundglass and alveolar infiltrates throughout the peripheral lung fields possibly due to an atypical pneumonia. MRI of the brain was done without contrast with no real acute changes. Echocardiogram was obtained which was essentially benign per them. They treated her with Rocephin, vancomycin, and azithromycin.  She did grow Streptococcus or Rollison 2 out of 2 blood cultures.  Infectious disease was consulted and recommended continuing antibiotics.  Hematology there was consulted due to concern for possible dermatomyositis and stated  per the notes that the biopsy report a spongiotic dermatitis is currently associated with atopic dermatitis. They agreed with stopping azithromycin as it could be adding to the pancytopenia and possible transaminitis. She was given steroids. Oncology saw her for pancytopenia and felt it was due to immunosuppression and did not require further work-up. The patient condition eventually improved and she did require oxygen supplementation and underwent an ultrasound-guided thoracentesis removing 300 cc of yellow fluid. Pathology failed to identify any unique cell population such as a lymphoid or myeloid origin. A PICC line was placed and she was recommended continue Rocephin for the streptococcal Acharya bacteremia. She was recommended follow-up with rheumatology, infectious disease, oncology for her symptoms and for neurology for her weakness. She was felt to be stable and was discharged home by them with outpt care to continue there with the specialists who saw. They mention that she is to follow-up with: 1.  Dr. Jovany Carlson for her hematologic issues. 2. For ID with Dr Frances Mancilla and Dr Shade Brooke. 3. For rheum with Michell Baron.  Discharge medicines listed included : 1.	Ceftriaxone 2 g IV daily. 2.	Cholecalciferol. 3.	Cyanocobalamin. 4.	Fluoxetine 80 mg daily. 5.	Keppra 500 mg p.o. twice daily.  Magnesium hydroxide oral nightly. 6.	Nortriptyline 25 mg nightly. 7.	Nystatin swish and swallow 4 times daily. 8.	Pantoprazole 40 mg a day. 9.	Prednisone 10 mg p.o. daily.  Additional tests sent:  August 3 chest x-ray showed no pneumothorax post thoracentesis on the right.  Ill-defined infiltrates in the left lung could represent residual pneumonia and or vascular congestion. August 5 labs showed white cell count 2.2 hemoglobin 9.1 white count 165 with laboratory review showing July 30 the white count being 3 hemoglobin 6.9 and plate count 158.  August 5 labs show sodium 139 potassium 3.0 chloride 113 CO2 of 19 BUN of 10 creatinine 0.5 total protein 5.7 total bili 1.0 alkaline phosphatase 178  .  August 1 total bilirubin 2.0 alkaline phosphatase 201  ALT 73.  July 28 bilirubin between 1.9 up to 2.  Alkaline phos 156 AST of 80 ALT 45. Saw a double-stranded DNA on July 31 that was negative GALEN screen was negative.  IgA was elevated 449 IgG was 1582 IgM 165 AMA was negative.  Hepatitis A, B, and C panel was negative. Summary: 1. At this point would recommend since were almost 3 weeks past the time of these labs to repeat blood work to see how the patient is doing at this point.  If the patient is doing labs soon with his other physicians she could have that information sent to us as well when it is being done. 2. We will send the patient a summary of our review from the hospitalization. 3. It would be very helpful for us to understand better what was suspected at the visits if now when she sees the specialist in follow p to see what their notes say as they can then review any new post hospitalization visit info plus the in hospital results and give their further opinions re the her case. 4. Treatment of the significant infection she had and respiratory issues will obviously likely help not just the liver enzymes but may have help her clinical picture overall not just the symptoms that she had.   Dr. Sosa

## 2021-11-11 ENCOUNTER — TELEPHONE ENCOUNTER (OUTPATIENT)
Dept: URBAN - METROPOLITAN AREA CLINIC 86 | Facility: CLINIC | Age: 51
End: 2021-11-11

## 2021-11-15 ENCOUNTER — OFFICE VISIT (OUTPATIENT)
Dept: URBAN - METROPOLITAN AREA CLINIC 98 | Facility: CLINIC | Age: 51
End: 2021-11-15

## 2021-11-19 ENCOUNTER — DASHBOARD ENCOUNTERS (OUTPATIENT)
Age: 51
End: 2021-11-19

## 2021-11-19 ENCOUNTER — TELEPHONE ENCOUNTER (OUTPATIENT)
Dept: URBAN - METROPOLITAN AREA CLINIC 86 | Facility: CLINIC | Age: 51
End: 2021-11-19

## 2021-11-19 ENCOUNTER — OFFICE VISIT (OUTPATIENT)
Dept: URBAN - METROPOLITAN AREA CLINIC 98 | Facility: CLINIC | Age: 51
End: 2021-11-19
Payer: COMMERCIAL

## 2021-11-19 ENCOUNTER — WEB ENCOUNTER (OUTPATIENT)
Dept: URBAN - METROPOLITAN AREA CLINIC 98 | Facility: CLINIC | Age: 51
End: 2021-11-19

## 2021-11-19 ENCOUNTER — OUT OF OFFICE VISIT (OUTPATIENT)
Dept: URBAN - METROPOLITAN AREA MEDICAL CENTER 28 | Facility: MEDICAL CENTER | Age: 51
End: 2021-11-19
Payer: COMMERCIAL

## 2021-11-19 VITALS
SYSTOLIC BLOOD PRESSURE: 122 MMHG | HEART RATE: 99 BPM | HEIGHT: 61 IN | WEIGHT: 293 LBS | DIASTOLIC BLOOD PRESSURE: 73 MMHG | BODY MASS INDEX: 55.32 KG/M2 | TEMPERATURE: 97.6 F

## 2021-11-19 DIAGNOSIS — R94.5 ABNORMAL LFTS: ICD-10-CM

## 2021-11-19 DIAGNOSIS — R74.01 ABNORMAL/ELEVATED TRANSAMINASE (SGOT, AMINOTRANSFERASE): ICD-10-CM

## 2021-11-19 DIAGNOSIS — R10.9 ABDOMINAL DISCOMFORT: ICD-10-CM

## 2021-11-19 DIAGNOSIS — D64.89 ANEMIA DUE TO OTHER CAUSE: ICD-10-CM

## 2021-11-19 DIAGNOSIS — D50.0 IRON DEFICIENCY ANEMIA DUE TO CHRONIC BLOOD LOSS: ICD-10-CM

## 2021-11-19 DIAGNOSIS — K80.20 ASYMPTOMATIC CHOLELITHIASIS: ICD-10-CM

## 2021-11-19 DIAGNOSIS — R74.8 ABNORMAL ALKALINE PHOSPHATASE TEST: ICD-10-CM

## 2021-11-19 DIAGNOSIS — K92.1 MELENA: ICD-10-CM

## 2021-11-19 PROBLEM — 724556004: Status: ACTIVE | Noted: 2021-11-19

## 2021-11-19 PROCEDURE — G8427 DOCREV CUR MEDS BY ELIG CLIN: HCPCS | Performed by: PHYSICIAN ASSISTANT

## 2021-11-19 PROCEDURE — 99214 OFFICE O/P EST MOD 30 MIN: CPT | Performed by: INTERNAL MEDICINE

## 2021-11-19 PROCEDURE — 99232 SBSQ HOSP IP/OBS MODERATE 35: CPT | Performed by: PHYSICIAN ASSISTANT

## 2021-11-19 PROCEDURE — 99222 1ST HOSP IP/OBS MODERATE 55: CPT | Performed by: PHYSICIAN ASSISTANT

## 2021-11-19 RX ORDER — HYDROXYCHLOROQUINE SULFATE 200 MG/1
1 TABLET TABLET ORAL
Refills: 0 | Status: ACTIVE | COMMUNITY
Start: 1900-01-01

## 2021-11-19 RX ORDER — AZATHIOPRINE 50 MG/1
3 TABLET ORAL
Status: ACTIVE | COMMUNITY

## 2021-11-19 RX ORDER — FLUOXETINE HYDROCHLORIDE 40 MG/1
TAKE 2 CAPSULES (80 MG) BY ORAL ROUTE ONCE DAILY IN THE MORNING CAPSULE ORAL 1
Qty: 0 | Refills: 0 | Status: ACTIVE | COMMUNITY
Start: 1900-01-01

## 2021-11-19 RX ORDER — PREDNISONE 5 MG/1
1 TABLET TABLET ORAL ONCE A DAY
Refills: 0 | Status: ACTIVE | COMMUNITY
Start: 1900-01-01

## 2021-11-19 RX ORDER — NORTRIPTYLINE HYDROCHLORIDE 25 MG/1
1 CAPSULE CAPSULE ORAL ONCE A DAY
Status: ACTIVE | COMMUNITY

## 2021-11-19 NOTE — HPI-TODAY'S VISIT:
Diagnosed with autoimmune disease 2 years ago. ?dermatomyositis.  Was on imuran, plaquenil and prednisone.  Since May- having issues with weakness  Was at [facility]- treated for sepsis Was also anemic and did prolonged abx Required IV iron and PRBC FOBT positive. Was taking oral iron till 2 weeks ago.  Last hgb 10- last PRBC 3 weeks ago. IV iron this week No CIBH - does have dark stools No GERD. Episodes of vomiting.  Does have abdominal discomfort.

## 2021-11-20 ENCOUNTER — OUT OF OFFICE VISIT (OUTPATIENT)
Dept: URBAN - METROPOLITAN AREA MEDICAL CENTER 28 | Facility: MEDICAL CENTER | Age: 51
End: 2021-11-20
Payer: COMMERCIAL

## 2021-11-20 DIAGNOSIS — K80.20 ASYMPTOMATIC CHOLELITHIASIS: ICD-10-CM

## 2021-11-20 DIAGNOSIS — D64.89 ANEMIA DUE TO OTHER CAUSE: ICD-10-CM

## 2021-11-20 DIAGNOSIS — K92.1 ACUTE MELENA: ICD-10-CM

## 2021-11-20 DIAGNOSIS — R79.89 ABNORMAL C-REACTIVE PROTEIN: ICD-10-CM

## 2021-11-20 PROCEDURE — 99232 SBSQ HOSP IP/OBS MODERATE 35: CPT | Performed by: INTERNAL MEDICINE

## 2021-11-22 ENCOUNTER — OUT OF OFFICE VISIT (OUTPATIENT)
Dept: URBAN - METROPOLITAN AREA MEDICAL CENTER 28 | Facility: MEDICAL CENTER | Age: 51
End: 2021-11-22
Payer: COMMERCIAL

## 2021-11-22 DIAGNOSIS — D50.9 ANEMIA: ICD-10-CM

## 2021-11-22 DIAGNOSIS — K31.82 DIEULAFOY LESION (HEMORRHAGIC) OF STOMACH AND DUODENUM: ICD-10-CM

## 2021-11-22 PROCEDURE — 45378 DIAGNOSTIC COLONOSCOPY: CPT | Performed by: INTERNAL MEDICINE

## 2021-11-22 PROCEDURE — 43255 EGD CONTROL BLEEDING ANY: CPT | Performed by: INTERNAL MEDICINE

## 2021-12-04 ENCOUNTER — OUT OF OFFICE VISIT (OUTPATIENT)
Dept: URBAN - METROPOLITAN AREA MEDICAL CENTER 24 | Facility: MEDICAL CENTER | Age: 51
End: 2021-12-04
Payer: COMMERCIAL

## 2021-12-04 DIAGNOSIS — D50.9 ANEMIA: ICD-10-CM

## 2021-12-04 DIAGNOSIS — R74.8 ABNORMAL ALKALINE PHOSPHATASE TEST: ICD-10-CM

## 2021-12-04 PROCEDURE — 99222 1ST HOSP IP/OBS MODERATE 55: CPT | Performed by: INTERNAL MEDICINE

## 2021-12-04 PROCEDURE — G8427 DOCREV CUR MEDS BY ELIG CLIN: HCPCS | Performed by: INTERNAL MEDICINE

## 2021-12-05 ENCOUNTER — OUT OF OFFICE VISIT (OUTPATIENT)
Dept: URBAN - METROPOLITAN AREA MEDICAL CENTER 24 | Facility: MEDICAL CENTER | Age: 51
End: 2021-12-05
Payer: COMMERCIAL

## 2021-12-05 DIAGNOSIS — R41.82 ALTERED MENTAL STATE: ICD-10-CM

## 2021-12-05 DIAGNOSIS — D50.9 ANEMIA: ICD-10-CM

## 2021-12-05 DIAGNOSIS — R74.8 ABNORMAL ALKALINE PHOSPHATASE TEST: ICD-10-CM

## 2021-12-05 PROCEDURE — 99233 SBSQ HOSP IP/OBS HIGH 50: CPT | Performed by: INTERNAL MEDICINE

## 2021-12-06 ENCOUNTER — OUT OF OFFICE VISIT (OUTPATIENT)
Dept: URBAN - METROPOLITAN AREA MEDICAL CENTER 24 | Facility: MEDICAL CENTER | Age: 51
End: 2021-12-06
Payer: COMMERCIAL

## 2021-12-06 DIAGNOSIS — D50.9 ANEMIA: ICD-10-CM

## 2021-12-06 DIAGNOSIS — J96.00 ACUTE RESPIRATORY FAILURE: ICD-10-CM

## 2021-12-06 DIAGNOSIS — R74.01 ABNORMAL/ELEVATED TRANSAMINASE (SGOT, AMINOTRANSFERASE): ICD-10-CM

## 2021-12-06 PROCEDURE — 99232 SBSQ HOSP IP/OBS MODERATE 35: CPT | Performed by: INTERNAL MEDICINE
